# Patient Record
Sex: MALE | Race: AMERICAN INDIAN OR ALASKA NATIVE | NOT HISPANIC OR LATINO | URBAN - METROPOLITAN AREA
[De-identification: names, ages, dates, MRNs, and addresses within clinical notes are randomized per-mention and may not be internally consistent; named-entity substitution may affect disease eponyms.]

---

## 2018-07-16 VITALS
DIASTOLIC BLOOD PRESSURE: 64 MMHG | RESPIRATION RATE: 18 BRPM | TEMPERATURE: 97 F | SYSTOLIC BLOOD PRESSURE: 142 MMHG | HEIGHT: 67 IN | OXYGEN SATURATION: 99 % | WEIGHT: 184.97 LBS | HEART RATE: 65 BPM

## 2018-07-16 NOTE — H&P ADULT - PMH
Diabetes    HTN (hypertension)    Mitral regurgitation    Obesity    PVD (peripheral vascular disease)

## 2018-07-16 NOTE — H&P ADULT - RS GEN PE MLT RESP DETAILS PC
no wheezes/breath sounds equal/no rales/clear to auscultation bilaterally/no chest wall tenderness/no rhonchi

## 2018-07-16 NOTE — H&P ADULT - HISTORY OF PRESENT ILLNESS
Patient is a 56 y/o male active smoker with PMHx of HTN, DM, MR, PVD (s/p fem-pop bypass bilaterally), hx of hyperkalemia with ACE/ARB therapy, obesity, with recent hospitalization in [facility] for "collapsed lung" who presented to cardiologist Dr. De La Rosa c/o     NST 06/21/2018: EF 53%, moderate sized area of anterior apical and inferior wall ischemia, LV wall motion is normal    In light of pt's risk factors, CCS ___ anginal equivalent symptoms, and abnormal stress test pt is referred for cardiac catheterization with possible intervention if clinically indicated to r/o CAD Please confirm meds    Patient is a 56 y/o male former smoker with PMHx of HTN, DM, MR, PVD (s/p fem-pop bypass bilaterally), hx of hyperkalemia with ACE/ARB therapy, obesity, with recent hospitalization in [facility] for "collapsed lung" and dyspnea and was referred to Dr. De La Rosa for stress test. Pt c/o DALEY upon walking a half block that is subsequently resolved with rest. Pt denies CP, palpitations, syncope, PND/orthopnea or LE edema.  NST 06/21/2018: EF 53%, moderate sized area of anterior apical and inferior wall ischemia, LV wall motion is normal    In light of pt's risk factors, CCS III anginal equivalent symptoms, and abnormal stress test pt is referred for cardiac catheterization with possible intervention if clinically indicated to r/o CAD Patient is a 56 y/o male former smoker with PMHx of HTN, DM, MR, PVD (s/p fem-pop bypass bilaterally), hx of hyperkalemia with ACE/ARB therapy, obesity, with recent hospitalization in [facility] for "collapsed lung" and dyspnea and was referred to Dr. De La Rosa for stress test. Pt c/o DALEY upon walking a half block that is subsequently resolved with rest. Pt denies CP, palpitations, syncope, PND/orthopnea or LE edema.  NST 06/21/2018: EF 53%, moderate sized area of anterior apical and inferior wall ischemia, LV wall motion is normal    In light of pt's risk factors, CCS III anginal equivalent symptoms, and abnormal stress test pt is referred for cardiac catheterization with possible intervention if clinically indicated to r/o CAD Patient is a 56 y/o male active smoker with PMHx of HTN, DM, MR, PVD (s/p fem-pop bypass bilaterally), hx of hyperkalemia with ACE/ARB therapy, obesity, with recent hospitalization in [facility] for "collapsed lung" and dyspnea and was referred to Dr. De La Rosa for stress test. Pt c/o DALEY upon walking a half block that is subsequently resolved with rest. Pt denies CP, palpitations, syncope, PND/orthopnea or LE edema.  NST 06/21/2018: EF 53%, moderate sized area of anterior apical and inferior wall ischemia, LV wall motion is normal    In light of pt's risk factors, CCS III anginal equivalent symptoms, and abnormal stress test pt is referred for cardiac catheterization with possible intervention if clinically indicated to r/o CAD

## 2018-07-16 NOTE — H&P ADULT - EXTREMITIES COMMENTS
+1 pitting edema bilaterally up to shin +1 pitting edema bilaterally up to shin, scars bilaterally (right from knee to groin, left entire leg)

## 2018-07-16 NOTE — H&P ADULT - ASSESSMENT
56 y/o male former smoker with PMHx of HTN, DM, MR, PVD (s/p fem-pop bypass bilaterally), hx of hyperkalemia with ACE/ARB therapy, obesity, with recent hospitalization in [facility] for "collapsed lung" and dyspnea, was recently found to have an abnormal nuclear stress test. Pt is therefore being referred for cardiac catheterization with possible intervention if indicated.     - Pt did not take any medications this morning   - Will given  mg and Plavix 300 mg prior-to-procedure per Dr. De La Rosa   - EKG w/ NSR, peaked T waves in V3-V4, TWI in avL   - Labs reviewed   - , will continue to monitor closely   - The procedure, it's risks, consequences, and benefits were discussed with patient in detail. Pt verbalized understanding and agreed to proceed with the procedure, consent signed and placed in chart.

## 2018-07-17 ENCOUNTER — OUTPATIENT (OUTPATIENT)
Dept: OUTPATIENT SERVICES | Facility: HOSPITAL | Age: 58
LOS: 1 days | Discharge: HOME | End: 2018-07-17
Payer: COMMERCIAL

## 2018-07-17 DIAGNOSIS — Z90.49 ACQUIRED ABSENCE OF OTHER SPECIFIED PARTS OF DIGESTIVE TRACT: Chronic | ICD-10-CM

## 2018-07-17 LAB
ANION GAP SERPL CALC-SCNC: 14 MMOL/L — SIGNIFICANT CHANGE UP (ref 5–17)
APTT BLD: 30.4 SEC — SIGNIFICANT CHANGE UP (ref 27.5–37.4)
BASOPHILS NFR BLD AUTO: 0.5 % — SIGNIFICANT CHANGE UP (ref 0–2)
BUN SERPL-MCNC: 28 MG/DL — HIGH (ref 7–23)
CALCIUM SERPL-MCNC: 9.2 MG/DL — SIGNIFICANT CHANGE UP (ref 8.4–10.5)
CHLORIDE SERPL-SCNC: 98 MMOL/L — SIGNIFICANT CHANGE UP (ref 96–108)
CHOLEST SERPL-MCNC: 222 MG/DL — HIGH (ref 10–199)
CK MB CFR SERPL CALC: 4.4 NG/ML — SIGNIFICANT CHANGE UP (ref 0–6.7)
CK SERPL-CCNC: 235 U/L — HIGH (ref 30–200)
CO2 SERPL-SCNC: 26 MMOL/L — SIGNIFICANT CHANGE UP (ref 22–31)
CREAT SERPL-MCNC: 1.17 MG/DL — SIGNIFICANT CHANGE UP (ref 0.5–1.3)
CRP SERPL-MCNC: 0.38 MG/DL — SIGNIFICANT CHANGE UP (ref 0–0.4)
EOSINOPHIL NFR BLD AUTO: 1.2 % — SIGNIFICANT CHANGE UP (ref 0–6)
GLUCOSE BLDC GLUCOMTR-MCNC: 193 MG/DL — HIGH (ref 70–99)
GLUCOSE SERPL-MCNC: 199 MG/DL — HIGH (ref 70–99)
HBA1C BLD-MCNC: 10.9 % — HIGH (ref 4–5.6)
HCT VFR BLD CALC: 35.8 % — LOW (ref 39–50)
HDLC SERPL-MCNC: 48 MG/DL — SIGNIFICANT CHANGE UP (ref 40–125)
HGB BLD-MCNC: 10.6 G/DL — LOW (ref 13–17)
INR BLD: 1.02 — SIGNIFICANT CHANGE UP (ref 0.88–1.16)
LIPID PNL WITH DIRECT LDL SERPL: 105 MG/DL — SIGNIFICANT CHANGE UP
LYMPHOCYTES # BLD AUTO: 35.2 % — SIGNIFICANT CHANGE UP (ref 13–44)
MCHC RBC-ENTMCNC: 21.1 PG — LOW (ref 27–34)
MCHC RBC-ENTMCNC: 29.6 G/DL — LOW (ref 32–36)
MCV RBC AUTO: 71.3 FL — LOW (ref 80–100)
MONOCYTES NFR BLD AUTO: 10.6 % — SIGNIFICANT CHANGE UP (ref 2–14)
NEUTROPHILS NFR BLD AUTO: 52.5 % — SIGNIFICANT CHANGE UP (ref 43–77)
PLATELET # BLD AUTO: 211 K/UL — SIGNIFICANT CHANGE UP (ref 150–400)
POTASSIUM SERPL-MCNC: 4.8 MMOL/L — SIGNIFICANT CHANGE UP (ref 3.5–5.3)
POTASSIUM SERPL-SCNC: 4.8 MMOL/L — SIGNIFICANT CHANGE UP (ref 3.5–5.3)
PROTHROM AB SERPL-ACNC: 11.3 SEC — SIGNIFICANT CHANGE UP (ref 9.8–12.7)
RBC # BLD: 5.02 M/UL — SIGNIFICANT CHANGE UP (ref 4.2–5.8)
RBC # FLD: 16.9 % — SIGNIFICANT CHANGE UP (ref 10.3–16.9)
SODIUM SERPL-SCNC: 138 MMOL/L — SIGNIFICANT CHANGE UP (ref 135–145)
TOTAL CHOLESTEROL/HDL RATIO MEASUREMENT: 4.6 RATIO — SIGNIFICANT CHANGE UP (ref 3.4–9.6)
TRIGL SERPL-MCNC: 347 MG/DL — HIGH (ref 10–149)
WBC # BLD: 11.2 K/UL — HIGH (ref 3.8–10.5)
WBC # FLD AUTO: 11.2 K/UL — HIGH (ref 3.8–10.5)

## 2018-07-17 PROCEDURE — 92978 ENDOLUMINL IVUS OCT C 1ST: CPT | Mod: 26,LD

## 2018-07-17 PROCEDURE — 92978 ENDOLUMINL IVUS OCT C 1ST: CPT | Mod: LM

## 2018-07-17 PROCEDURE — 93458 L HRT ARTERY/VENTRICLE ANGIO: CPT | Mod: 26

## 2018-07-17 PROCEDURE — 80061 LIPID PANEL: CPT

## 2018-07-17 PROCEDURE — 82553 CREATINE MB FRACTION: CPT

## 2018-07-17 PROCEDURE — 85025 COMPLETE CBC W/AUTO DIFF WBC: CPT

## 2018-07-17 PROCEDURE — C1769: CPT

## 2018-07-17 PROCEDURE — 80048 BASIC METABOLIC PNL TOTAL CA: CPT

## 2018-07-17 PROCEDURE — 99223 1ST HOSP IP/OBS HIGH 75: CPT

## 2018-07-17 PROCEDURE — 86140 C-REACTIVE PROTEIN: CPT

## 2018-07-17 PROCEDURE — 92979 ENDOLUMINL IVUS OCT C EA: CPT | Mod: 26,LM

## 2018-07-17 PROCEDURE — 93458 L HRT ARTERY/VENTRICLE ANGIO: CPT

## 2018-07-17 PROCEDURE — C1753: CPT

## 2018-07-17 PROCEDURE — 82962 GLUCOSE BLOOD TEST: CPT

## 2018-07-17 PROCEDURE — 83036 HEMOGLOBIN GLYCOSYLATED A1C: CPT

## 2018-07-17 PROCEDURE — 85610 PROTHROMBIN TIME: CPT

## 2018-07-17 PROCEDURE — 82550 ASSAY OF CK (CPK): CPT

## 2018-07-17 PROCEDURE — 85730 THROMBOPLASTIN TIME PARTIAL: CPT

## 2018-07-17 PROCEDURE — C1887: CPT

## 2018-07-17 PROCEDURE — 92979 ENDOLUMINL IVUS OCT C EA: CPT | Mod: LD

## 2018-07-17 RX ORDER — SODIUM CHLORIDE 9 MG/ML
500 INJECTION INTRAMUSCULAR; INTRAVENOUS; SUBCUTANEOUS
Qty: 0 | Refills: 0 | Status: DISCONTINUED | OUTPATIENT
Start: 2018-07-17 | End: 2018-07-17

## 2018-07-17 RX ORDER — ASPIRIN/CALCIUM CARB/MAGNESIUM 324 MG
325 TABLET ORAL ONCE
Qty: 0 | Refills: 0 | Status: COMPLETED | OUTPATIENT
Start: 2018-07-17 | End: 2018-07-17

## 2018-07-17 RX ORDER — CHLORHEXIDINE GLUCONATE 213 G/1000ML
1 SOLUTION TOPICAL ONCE
Qty: 0 | Refills: 0 | Status: DISCONTINUED | OUTPATIENT
Start: 2018-07-17 | End: 2018-07-17

## 2018-07-17 RX ORDER — INSULIN LISPRO 100/ML
VIAL (ML) SUBCUTANEOUS ONCE
Qty: 0 | Refills: 0 | Status: DISCONTINUED | OUTPATIENT
Start: 2018-07-17 | End: 2018-07-17

## 2018-07-17 RX ORDER — CLOPIDOGREL BISULFATE 75 MG/1
300 TABLET, FILM COATED ORAL ONCE
Qty: 0 | Refills: 0 | Status: COMPLETED | OUTPATIENT
Start: 2018-07-17 | End: 2018-07-17

## 2018-07-17 RX ADMIN — Medication 325 MILLIGRAM(S): at 10:46

## 2018-07-17 RX ADMIN — CLOPIDOGREL BISULFATE 300 MILLIGRAM(S): 75 TABLET, FILM COATED ORAL at 10:46

## 2018-07-17 NOTE — PROGRESS NOTE ADULT - SUBJECTIVE AND OBJECTIVE BOX
Interventional Cardiology PA SDA Discharge Note      Patient seen and examined at bedside resting comfortably, denies any chest pain, SOB, palpitations, dizziness or radial access site pain.      Afebrile, VSS    Ext:    		Right  Radial : soft, NT, no hematoma, no bleeding, dressing; C/D/I      Pulses:    intact right radial pulse 2+    A/P:  55 yr old M former smoker with PMHx of HTN, DM, MR, PVD (s/p fem-pop bypass bilaterally), with CCS Angina Class III equivalent symptoms and abnormal NST who presented to [facility] for evaluation. Patient s/p diagnostic cardiac catheterization which revealed 40% dLM lesion (IVUS 5.0mm2), 85% pLAD lesion (IVUS <2.0mm2), 90% OM1 lesion, 80% p/m/dRCA lesion, EF:65%, EDP 18mmHg. Patient recommended to continue medical therapy and will be screened for Hybrid trial with Dr. Matthews.                  1.	Stable for discharge as per attending Dr. De La Rosa after pt voids, wrist stable and 30 minutes of ambulation.  2.	Follow-up with PMD/Cardiologist Dr. Gary De La Rosa in 1-2 weeks  3.	Discharged forms signed and copies in chart

## 2018-07-17 NOTE — CONSULT NOTE ADULT - SUBJECTIVE AND OBJECTIVE BOX
Surgeon: Dr. Matthews    Requesting Physician: Dr. Guillen    HISTORY OF PRESENT ILLNESS:  Patient is a 54 y/o male former smoker with PMHx of HTN, DM, MR, PVD (s/p fem-pop bypass bilaterally), hx of hyperkalemia with ACE/ARB therapy, obesity, with recent hospitalization in [facility] for "collapsed lung" and dyspnea and was referred to Dr. De La Rosa for stress test. Pt c/o DALEY upon walking a half block that is subsequently resolved with rest. Pt denies CP, palpitations, syncope, PND/orthopnea or LE edema.  NST 06/21/2018: EF 53%, moderate sized area of anterior apical and inferior wall ischemia, LV wall motion is normal.  In light of pt's risk factors, CCS III anginal equivalent symptoms, and abnormal stress test pt is referred for cardiac catheterization with possible intervention if clinically indicated to r/o CAD.  Cardiac cath done revealed 85% LAD, 90% OM, and Dr. Matthews consulted for Hybrid trial.  Patient currently denies CP, SOB, palpitations.      PAST MEDICAL & SURGICAL HISTORY:  Mitral regurgitation  Obesity  PVD (peripheral vascular disease)  Diabetes  HTN (hypertension)  History of appendectomy    MEDICATIONS  (STANDING):  sodium chloride 0.9%. 500 milliLiter(s) (75 mL/Hr) IV Continuous <Continuous>    MEDICATIONS  (PRN):    Allergies    No Known Allergies    Intolerances    SOCIAL HISTORY:  Smoker:  YES, active everyday smoker        ETOH use:   NO               Ilicit Drug use:  NO  Assisted device use (Cane / Walker): none  Live with: family    FAMILY HISTORY:  No pertinent family history in first degree relatives    Review of Systems  CONSTITUTIONAL: Denies Fevers / chills, sweats, fatigue, weight loss, weight gain     NEURO: Denies parathesias, seizures, syncope, confusion    EYES: Denies Blurry vision, discharge, pain, loss of vision     ENMT: Denies Difficulty hearing, vertigo, dysphagia, epistaxis, recent dental work    CV: +DALEY Denies Chest pain, palpitations, orthopnea   RESPIRATORY: Denies Wheezing, SOB, cough / sputum, hemoptysis    GI: Denies Nausea, vommiting, diarrhea, constipation, melena     : Denies Hematuria, dysuria, urgency, incontinence       MUSKULOSKELETAL: Denies  arthritis, joint swelling, muscle weakness    SKIN/BREAST: Denies  rash, itching, benson loss, masses   PSYCH:  Denies depresion, anxiety, suicidal ideation       HEME/LYMPH: Denies  bruises easily, enlarged lymph nodes, tender lymph nodes     ENDOCRINE:  Denies cold intolerance, heat intolerance, polydipsia       PHYSICAL EXAM  Vital Signs Last 24 Hrs  T(C): --  T(F): --  HR: --  BP: --  BP(mean): --  RR: --  SpO2: --    CONSTITUTIONAL:  WNL  NEURO: WNL                      EYES: WNL  ENMT:  WNL  CV:  WNL  RESPIRATORY:  WNL  GI: WNL  : ZAMORA + / - WNL  MUSKULOSKELETAL:   WNL  SKIN / BREAST: WNL  Extremities +1 pitting edema bilaterally up to shin,  Vascular radial pulses 1+ right, 2+ left.  DP/PT pulses dopplerable but not palpable                                                           LABS:                        10.6   11.2  )-----------( 211      ( 17 Jul 2018 09:57 )             35.8     07-17    138  |  98  |  28<H>  ----------------------------<  199<H>  4.8   |  26  |  1.17    Ca    9.2      17 Jul 2018 09:57      PT/INR - ( 17 Jul 2018 10:22 )   PT: 11.3 sec;   INR: 1.02          PTT - ( 17 Jul 2018 10:22 )  PTT:30.4 sec    CARDIAC MARKERS ( 17 Jul 2018 09:57 )  x     / x     / 235 U/L / x     / 4.4 ng/mL      Hemoglobin A1C, Whole Blood: 10.9 % (07-17 @ 09:57)      RADIOLOGY & ADDITIONAL STUDIES:  CAROTID U/S: N/A    CXR: N/A    CT Scan:N/A    EKG: N/A    TTE / JOEY: N/A    Cardiac Cath: in HPI

## 2018-07-17 NOTE — CONSULT NOTE ADULT - ASSESSMENT
A/P:  55 yr old M former smoker with PMHx of HTN, DM, MR, PVD (s/p fem-pop bypass bilaterally), with CCS Angina Class III equivalent symptoms and abnormal NST who presented to [facility] for evaluation. Patient s/p diagnostic cardiac catheterization which revealed 40% dLM lesion (IVUS 5.0mm2), 85% pLAD lesion (IVUS <2.0mm2), 90% OM1 lesion, 80% p/m/dRCA lesion, EF:65%, EDP 18mmHg. Dr. Matthews evaluated and patient deemed candidate for Hybrid trial, randomized to PCI group.     Con't with medical therapy per primary team  PCI per Dr. Guillen.

## 2018-07-18 PROBLEM — I34.0 NONRHEUMATIC MITRAL (VALVE) INSUFFICIENCY: Chronic | Status: ACTIVE | Noted: 2018-07-16

## 2018-07-18 PROBLEM — I73.9 PERIPHERAL VASCULAR DISEASE, UNSPECIFIED: Chronic | Status: ACTIVE | Noted: 2018-07-16

## 2018-07-18 PROBLEM — E66.9 OBESITY, UNSPECIFIED: Chronic | Status: ACTIVE | Noted: 2018-07-16

## 2018-07-26 VITALS
HEIGHT: 69 IN | HEART RATE: 65 BPM | DIASTOLIC BLOOD PRESSURE: 79 MMHG | OXYGEN SATURATION: 98 % | SYSTOLIC BLOOD PRESSURE: 170 MMHG | RESPIRATION RATE: 18 BRPM | WEIGHT: 190.04 LBS | TEMPERATURE: 99 F

## 2018-07-26 RX ORDER — CHLORHEXIDINE GLUCONATE 213 G/1000ML
1 SOLUTION TOPICAL ONCE
Qty: 0 | Refills: 0 | Status: DISCONTINUED | OUTPATIENT
Start: 2018-07-27 | End: 2018-07-28

## 2018-07-26 NOTE — H&P ADULT - HISTORY OF PRESENT ILLNESS
Patient is a 54 y/o male active smoker with PMHx of HTN, DM, MR, PVD (s/p fem-pop bypass bilaterally), hx of hyperkalemia with ACE/ARB therapy, obesity, with recent hospitalization in [facility] for "collapsed lung" and dyspnea and was referred to Dr. De La Rosa for stress test. Pt c/o DALEY upon walking a half block that is subsequently resolved with rest. Pt denies CP, palpitations, syncope, PND/orthopnea or LE edema.  NST 06/21/2018: EF 53%, moderate sized area of anterior apical and inferior wall ischemia, LV wall motion is normal    In light of pt's risk factors, CCS III anginal equivalent symptoms, and abnormal stress test pt is referred for cardiac catheterization with possible intervention if clinically indicated to r/o CAD Confirm meds upon arrival    Patient is a 56 y/o male active smoker with PMHx of HTN, DM, MR, PVD (s/p fem-pop bypass bilaterally), hx of hyperkalemia with ACE/ARB therapy, obesity, with recent hospitalization in [facility] for "collapsed lung" and dyspnea and was referred to Dr. De La Rosa for stress test. Pt c/o DALEY upon walking a half block that is subsequently resolved with rest. Pt denies CP, palpitations, syncope, PND/orthopnea or LE edema.  NST 06/21/2018: EF 53%, moderate sized area of anterior apical and inferior wall ischemia, LV wall motion is normal    In light of pt's risk factors, CCS III anginal equivalent symptoms, and abnormal stress test pt is referred for cardiac catheterization with possible intervention if clinically indicated to r/o CAD Confirm meds upon arrival    Patient is a 56 y/o male active smoker with PMHx of HTN, DM, MR, PVD (s/p fem-pop bypass bilaterally), hx of hyperkalemia with ACE/ARB therapy, obesity, with recent hospitalization in [facility] for "collapsed lung" and dyspnea and was referred to Dr. De La Rosa for stress test. Pt states for the past three days he has been having constant left sided chest tightness 4/10 severity that radiates to his back that is associated with SOB and worsened with exertion but also occurs independent of activity. Pt reports that he thinks that pain started secondary to being nervous about upcoming staged PCI. Pt denies palpitations, syncope, PND/orthopnea or LE edema.  NST 06/21/2018: EF 53%, moderate sized area of anterior apical and inferior wall ischemia, LV wall motion is normal    In light of pt's risk factors, CCS IV anginal symptoms, and abnormal stress test and known residual disease pt returns for staged PCI Confirm meds upon arrival    Patient is a 54 y/o male active smoker with PMHx of HTN, DM, MR, PVD (s/p fem-pop bypass bilaterally), hx of hyperkalemia with ACE/ARB therapy, obesity, with recent hospitalization in [facility] for "collapsed lung" and dyspnea and was referred to Dr. De La Rosa for stress test. Pt states for the past three days he has been having constant left sided chest tightness 4/10 severity that radiates to his back that is associated with SOB and worsened with exertion but also occurs independent of activity. Pt reports that he thinks that pain started secondary to being nervous about upcoming staged PCI. Pt denies palpitations, syncope, PND/orthopnea or LE edema.  NST 06/21/2018: EF 53%, moderate sized area of anterior apical and inferior wall ischemia, LV wall motion is normal    Cardiac cath 07/17/2018: EF: 65%, EDP 18, dLM 50% stenosis, pLAD 85% stenosis, OM1 90% stenosis, pRCA 80%, mRCA 80%, dRCA 80% stenosis  In light of pt's risk factors, new CCS IV anginal symptoms, and abnormal stress test and known residual disease pt returns for staged PCI Patient is a 54 y/o male active smoker with PMHx of HTN, DM, MR, PVD (s/p fem-pop bypass bilaterally), hx of hyperkalemia with ACE/ARB therapy, obesity, with recent hospitalization in [facility] for "collapsed lung" and dyspnea and was referred to Dr. De La Rosa for stress test. Pt states for the past three days he has been having constant left sided chest tightness 4/10 severity that radiates to his back that is associated with SOB and worsened with exertion but also occurs independent of activity. Pt reports that he thinks that pain started secondary to being nervous about upcoming staged PCI. Pt denies palpitations, syncope, PND/orthopnea or LE edema.  NST 06/21/2018: EF 53%, moderate sized area of anterior apical and inferior wall ischemia, LV wall motion is normal    Cardiac cath 07/17/2018: EF: 65%, EDP 18, dLM 50% stenosis, pLAD 85% stenosis, OM1 90% stenosis, pRCA 80%, mRCA 80%, dRCA 80% stenosis  In light of pt's risk factors, new CCS IV anginal symptoms, and abnormal stress test and known residual disease pt returns for staged PCI

## 2018-07-26 NOTE — H&P ADULT - ASSESSMENT
Patient is a 54 y/o male active smoker with PMHx of HTN, DM, MR, PVD (s/p fem-pop bypass bilaterally), hx of hyperkalemia with ACE/ARB therapy, obesity presented for staged PCI of residual lesion    ASA III Mallampati III  Precath consented  Given daily dose of ASA 81mg POx1 and Plavix 75mg POx1  Started IVF nS @ 75cc/h    Risks & benefits of procedure and alternative therapy have been explained to the patient including but not limited to: allergic reaction, bleeding w/possible need for blood transfusion, infection, renal and vascular compromise, limb damage, arrhythmia, stroke, vessel dissection/perforation, Myocardial infarction, emergent CABG. Informed consent obtained and in chart.

## 2018-07-27 ENCOUNTER — INPATIENT (INPATIENT)
Facility: HOSPITAL | Age: 58
LOS: 0 days | Discharge: HOME | DRG: 247 | End: 2018-07-28
Attending: INTERNAL MEDICINE | Admitting: INTERNAL MEDICINE
Payer: COMMERCIAL

## 2018-07-27 DIAGNOSIS — Z90.49 ACQUIRED ABSENCE OF OTHER SPECIFIED PARTS OF DIGESTIVE TRACT: Chronic | ICD-10-CM

## 2018-07-27 LAB
ANION GAP SERPL CALC-SCNC: 12 MMOL/L — SIGNIFICANT CHANGE UP (ref 5–17)
APTT BLD: >200 SEC — CRITICAL HIGH (ref 27.5–37.4)
BASOPHILS NFR BLD AUTO: 0.7 % — SIGNIFICANT CHANGE UP (ref 0–2)
BUN SERPL-MCNC: 20 MG/DL — SIGNIFICANT CHANGE UP (ref 7–23)
CALCIUM SERPL-MCNC: 8.9 MG/DL — SIGNIFICANT CHANGE UP (ref 8.4–10.5)
CHLORIDE SERPL-SCNC: 101 MMOL/L — SIGNIFICANT CHANGE UP (ref 96–108)
CHOLEST SERPL-MCNC: 220 MG/DL — HIGH (ref 10–199)
CK MB CFR SERPL CALC: 3.7 NG/ML — SIGNIFICANT CHANGE UP (ref 0–6.7)
CK SERPL-CCNC: 230 U/L — HIGH (ref 30–200)
CO2 SERPL-SCNC: 26 MMOL/L — SIGNIFICANT CHANGE UP (ref 22–31)
CREAT SERPL-MCNC: 1.11 MG/DL — SIGNIFICANT CHANGE UP (ref 0.5–1.3)
CRP SERPL-MCNC: 0.42 MG/DL — HIGH (ref 0–0.4)
EOSINOPHIL NFR BLD AUTO: 2.5 % — SIGNIFICANT CHANGE UP (ref 0–6)
GLUCOSE BLDC GLUCOMTR-MCNC: 218 MG/DL — HIGH (ref 70–99)
GLUCOSE BLDC GLUCOMTR-MCNC: 257 MG/DL — HIGH (ref 70–99)
GLUCOSE SERPL-MCNC: 227 MG/DL — HIGH (ref 70–99)
HBA1C BLD-MCNC: 10.9 % — HIGH (ref 4–5.6)
HCT VFR BLD CALC: 35.1 % — LOW (ref 39–50)
HDLC SERPL-MCNC: 52 MG/DL — SIGNIFICANT CHANGE UP (ref 40–125)
HGB BLD-MCNC: 10.1 G/DL — LOW (ref 13–17)
INR BLD: 1.02 — SIGNIFICANT CHANGE UP (ref 0.88–1.16)
LIPID PNL WITH DIRECT LDL SERPL: 121 MG/DL — SIGNIFICANT CHANGE UP
LYMPHOCYTES # BLD AUTO: 41.3 % — SIGNIFICANT CHANGE UP (ref 13–44)
MCHC RBC-ENTMCNC: 21.1 PG — LOW (ref 27–34)
MCHC RBC-ENTMCNC: 28.8 G/DL — LOW (ref 32–36)
MCV RBC AUTO: 73.3 FL — LOW (ref 80–100)
MONOCYTES NFR BLD AUTO: 8.1 % — SIGNIFICANT CHANGE UP (ref 2–14)
NEUTROPHILS NFR BLD AUTO: 47.4 % — SIGNIFICANT CHANGE UP (ref 43–77)
PLATELET # BLD AUTO: 186 K/UL — SIGNIFICANT CHANGE UP (ref 150–400)
POTASSIUM SERPL-MCNC: 4.6 MMOL/L — SIGNIFICANT CHANGE UP (ref 3.5–5.3)
POTASSIUM SERPL-SCNC: 4.6 MMOL/L — SIGNIFICANT CHANGE UP (ref 3.5–5.3)
PROTHROM AB SERPL-ACNC: 11.3 SEC — SIGNIFICANT CHANGE UP (ref 9.8–12.7)
RBC # BLD: 4.79 M/UL — SIGNIFICANT CHANGE UP (ref 4.2–5.8)
RBC # FLD: 16.9 % — SIGNIFICANT CHANGE UP (ref 10.3–16.9)
SODIUM SERPL-SCNC: 139 MMOL/L — SIGNIFICANT CHANGE UP (ref 135–145)
TOTAL CHOLESTEROL/HDL RATIO MEASUREMENT: 4.2 RATIO — SIGNIFICANT CHANGE UP (ref 3.4–9.6)
TRIGL SERPL-MCNC: 236 MG/DL — HIGH (ref 10–149)
WBC # BLD: 9.9 K/UL — SIGNIFICANT CHANGE UP (ref 3.8–10.5)
WBC # FLD AUTO: 9.9 K/UL — SIGNIFICANT CHANGE UP (ref 3.8–10.5)

## 2018-07-27 PROCEDURE — 92978 ENDOLUMINL IVUS OCT C 1ST: CPT | Mod: 26,LD

## 2018-07-27 PROCEDURE — 93010 ELECTROCARDIOGRAM REPORT: CPT

## 2018-07-27 PROCEDURE — 92979 ENDOLUMINL IVUS OCT C EA: CPT | Mod: 26

## 2018-07-27 PROCEDURE — 99223 1ST HOSP IP/OBS HIGH 75: CPT | Mod: 25

## 2018-07-27 PROCEDURE — 99233 SBSQ HOSP IP/OBS HIGH 50: CPT

## 2018-07-27 PROCEDURE — 93458 L HRT ARTERY/VENTRICLE ANGIO: CPT | Mod: 26,XU

## 2018-07-27 PROCEDURE — 92928 PRQ TCAT PLMT NTRAC ST 1 LES: CPT | Mod: LD

## 2018-07-27 PROCEDURE — 76937 US GUIDE VASCULAR ACCESS: CPT | Mod: 26

## 2018-07-27 RX ORDER — HYDRALAZINE HCL 50 MG
50 TABLET ORAL EVERY 8 HOURS
Qty: 0 | Refills: 0 | Status: DISCONTINUED | OUTPATIENT
Start: 2018-07-28 | End: 2018-07-28

## 2018-07-27 RX ORDER — CLOPIDOGREL BISULFATE 75 MG/1
75 TABLET, FILM COATED ORAL DAILY
Qty: 0 | Refills: 0 | Status: DISCONTINUED | OUTPATIENT
Start: 2018-07-27 | End: 2018-07-28

## 2018-07-27 RX ORDER — SODIUM CHLORIDE 9 MG/ML
500 INJECTION INTRAMUSCULAR; INTRAVENOUS; SUBCUTANEOUS
Qty: 0 | Refills: 0 | Status: DISCONTINUED | OUTPATIENT
Start: 2018-07-27 | End: 2018-07-27

## 2018-07-27 RX ORDER — HYDRALAZINE HCL 50 MG
25 TABLET ORAL ONCE
Qty: 0 | Refills: 0 | Status: COMPLETED | OUTPATIENT
Start: 2018-07-27 | End: 2018-07-27

## 2018-07-27 RX ORDER — PANTOPRAZOLE SODIUM 20 MG/1
40 TABLET, DELAYED RELEASE ORAL DAILY
Qty: 0 | Refills: 0 | Status: DISCONTINUED | OUTPATIENT
Start: 2018-07-27 | End: 2018-07-28

## 2018-07-27 RX ORDER — LOSARTAN POTASSIUM 100 MG/1
50 TABLET, FILM COATED ORAL DAILY
Qty: 0 | Refills: 0 | Status: DISCONTINUED | OUTPATIENT
Start: 2018-07-27 | End: 2018-07-28

## 2018-07-27 RX ORDER — ALBUTEROL 90 UG/1
2 AEROSOL, METERED ORAL EVERY 6 HOURS
Qty: 0 | Refills: 0 | Status: DISCONTINUED | OUTPATIENT
Start: 2018-07-27 | End: 2018-07-28

## 2018-07-27 RX ORDER — ATORVASTATIN CALCIUM 80 MG/1
80 TABLET, FILM COATED ORAL DAILY
Qty: 0 | Refills: 0 | Status: DISCONTINUED | OUTPATIENT
Start: 2018-07-27 | End: 2018-07-28

## 2018-07-27 RX ORDER — BUDESONIDE AND FORMOTEROL FUMARATE DIHYDRATE 160; 4.5 UG/1; UG/1
2 AEROSOL RESPIRATORY (INHALATION)
Qty: 0 | Refills: 0 | Status: DISCONTINUED | OUTPATIENT
Start: 2018-07-27 | End: 2018-07-28

## 2018-07-27 RX ORDER — AMLODIPINE BESYLATE 2.5 MG/1
5 TABLET ORAL ONCE
Qty: 0 | Refills: 0 | Status: COMPLETED | OUTPATIENT
Start: 2018-07-27 | End: 2018-07-27

## 2018-07-27 RX ORDER — DEXTROSE 50 % IN WATER 50 %
25 SYRINGE (ML) INTRAVENOUS ONCE
Qty: 0 | Refills: 0 | Status: DISCONTINUED | OUTPATIENT
Start: 2018-07-27 | End: 2018-07-28

## 2018-07-27 RX ORDER — DOCUSATE SODIUM 100 MG
100 CAPSULE ORAL
Qty: 0 | Refills: 0 | Status: DISCONTINUED | OUTPATIENT
Start: 2018-07-27 | End: 2018-07-28

## 2018-07-27 RX ORDER — INSULIN GLARGINE 100 [IU]/ML
14 INJECTION, SOLUTION SUBCUTANEOUS AT BEDTIME
Qty: 0 | Refills: 0 | Status: DISCONTINUED | OUTPATIENT
Start: 2018-07-27 | End: 2018-07-28

## 2018-07-27 RX ORDER — GLUCAGON INJECTION, SOLUTION 0.5 MG/.1ML
1 INJECTION, SOLUTION SUBCUTANEOUS ONCE
Qty: 0 | Refills: 0 | Status: DISCONTINUED | OUTPATIENT
Start: 2018-07-27 | End: 2018-07-28

## 2018-07-27 RX ORDER — DEXTROSE 50 % IN WATER 50 %
15 SYRINGE (ML) INTRAVENOUS ONCE
Qty: 0 | Refills: 0 | Status: DISCONTINUED | OUTPATIENT
Start: 2018-07-27 | End: 2018-07-28

## 2018-07-27 RX ORDER — HYDRALAZINE HCL 50 MG
25 TABLET ORAL EVERY 8 HOURS
Qty: 0 | Refills: 0 | Status: DISCONTINUED | OUTPATIENT
Start: 2018-07-27 | End: 2018-07-27

## 2018-07-27 RX ORDER — ASPIRIN/CALCIUM CARB/MAGNESIUM 324 MG
81 TABLET ORAL DAILY
Qty: 0 | Refills: 0 | Status: DISCONTINUED | OUTPATIENT
Start: 2018-07-27 | End: 2018-07-28

## 2018-07-27 RX ORDER — AMLODIPINE BESYLATE 2.5 MG/1
10 TABLET ORAL DAILY
Qty: 0 | Refills: 0 | Status: DISCONTINUED | OUTPATIENT
Start: 2018-07-28 | End: 2018-07-28

## 2018-07-27 RX ORDER — DEXTROSE 50 % IN WATER 50 %
12.5 SYRINGE (ML) INTRAVENOUS ONCE
Qty: 0 | Refills: 0 | Status: DISCONTINUED | OUTPATIENT
Start: 2018-07-27 | End: 2018-07-28

## 2018-07-27 RX ORDER — SODIUM CHLORIDE 9 MG/ML
1000 INJECTION, SOLUTION INTRAVENOUS
Qty: 0 | Refills: 0 | Status: DISCONTINUED | OUTPATIENT
Start: 2018-07-27 | End: 2018-07-28

## 2018-07-27 RX ORDER — CLOPIDOGREL BISULFATE 75 MG/1
75 TABLET, FILM COATED ORAL ONCE
Qty: 0 | Refills: 0 | Status: COMPLETED | OUTPATIENT
Start: 2018-07-27 | End: 2018-07-27

## 2018-07-27 RX ORDER — FOLIC ACID 0.8 MG
1 TABLET ORAL DAILY
Qty: 0 | Refills: 0 | Status: DISCONTINUED | OUTPATIENT
Start: 2018-07-27 | End: 2018-07-28

## 2018-07-27 RX ORDER — AMLODIPINE BESYLATE 2.5 MG/1
5 TABLET ORAL DAILY
Qty: 0 | Refills: 0 | Status: DISCONTINUED | OUTPATIENT
Start: 2018-07-27 | End: 2018-07-27

## 2018-07-27 RX ORDER — INSULIN LISPRO 100/ML
VIAL (ML) SUBCUTANEOUS ONCE
Qty: 0 | Refills: 0 | Status: COMPLETED | OUTPATIENT
Start: 2018-07-27 | End: 2018-07-27

## 2018-07-27 RX ORDER — GABAPENTIN 400 MG/1
300 CAPSULE ORAL EVERY 8 HOURS
Qty: 0 | Refills: 0 | Status: DISCONTINUED | OUTPATIENT
Start: 2018-07-27 | End: 2018-07-28

## 2018-07-27 RX ORDER — INSULIN LISPRO 100/ML
VIAL (ML) SUBCUTANEOUS
Qty: 0 | Refills: 0 | Status: DISCONTINUED | OUTPATIENT
Start: 2018-07-27 | End: 2018-07-28

## 2018-07-27 RX ORDER — ASPIRIN/CALCIUM CARB/MAGNESIUM 324 MG
81 TABLET ORAL ONCE
Qty: 0 | Refills: 0 | Status: COMPLETED | OUTPATIENT
Start: 2018-07-27 | End: 2018-07-27

## 2018-07-27 RX ORDER — SODIUM CHLORIDE 9 MG/ML
500 INJECTION INTRAMUSCULAR; INTRAVENOUS; SUBCUTANEOUS
Qty: 0 | Refills: 0 | Status: DISCONTINUED | OUTPATIENT
Start: 2018-07-27 | End: 2018-07-28

## 2018-07-27 RX ADMIN — GABAPENTIN 300 MILLIGRAM(S): 400 CAPSULE ORAL at 14:34

## 2018-07-27 RX ADMIN — Medication 100 MILLIGRAM(S): at 17:24

## 2018-07-27 RX ADMIN — ATORVASTATIN CALCIUM 80 MILLIGRAM(S): 80 TABLET, FILM COATED ORAL at 21:17

## 2018-07-27 RX ADMIN — Medication 6: at 16:55

## 2018-07-27 RX ADMIN — BUDESONIDE AND FORMOTEROL FUMARATE DIHYDRATE 2 PUFF(S): 160; 4.5 AEROSOL RESPIRATORY (INHALATION) at 21:59

## 2018-07-27 RX ADMIN — AMLODIPINE BESYLATE 5 MILLIGRAM(S): 2.5 TABLET ORAL at 16:55

## 2018-07-27 RX ADMIN — INSULIN GLARGINE 14 UNIT(S): 100 INJECTION, SOLUTION SUBCUTANEOUS at 21:58

## 2018-07-27 RX ADMIN — Medication 4: at 09:54

## 2018-07-27 RX ADMIN — GABAPENTIN 300 MILLIGRAM(S): 400 CAPSULE ORAL at 21:17

## 2018-07-27 RX ADMIN — CLOPIDOGREL BISULFATE 75 MILLIGRAM(S): 75 TABLET, FILM COATED ORAL at 08:03

## 2018-07-27 RX ADMIN — Medication 25 MILLIGRAM(S): at 16:55

## 2018-07-27 RX ADMIN — Medication 81 MILLIGRAM(S): at 08:04

## 2018-07-27 RX ADMIN — AMLODIPINE BESYLATE 5 MILLIGRAM(S): 2.5 TABLET ORAL at 14:34

## 2018-07-27 RX ADMIN — Medication 25 MILLIGRAM(S): at 14:34

## 2018-07-27 RX ADMIN — LOSARTAN POTASSIUM 50 MILLIGRAM(S): 100 TABLET, FILM COATED ORAL at 14:34

## 2018-07-27 RX ADMIN — Medication 4: at 21:58

## 2018-07-27 RX ADMIN — SODIUM CHLORIDE 75 MILLILITER(S): 9 INJECTION INTRAMUSCULAR; INTRAVENOUS; SUBCUTANEOUS at 08:03

## 2018-07-28 ENCOUNTER — TRANSCRIPTION ENCOUNTER (OUTPATIENT)
Age: 58
End: 2018-07-28

## 2018-07-28 VITALS — TEMPERATURE: 97 F

## 2018-07-28 LAB
ANION GAP SERPL CALC-SCNC: 14 MMOL/L — SIGNIFICANT CHANGE UP (ref 5–17)
BUN SERPL-MCNC: 17 MG/DL — SIGNIFICANT CHANGE UP (ref 7–23)
CALCIUM SERPL-MCNC: 9.3 MG/DL — SIGNIFICANT CHANGE UP (ref 8.4–10.5)
CHLORIDE SERPL-SCNC: 100 MMOL/L — SIGNIFICANT CHANGE UP (ref 96–108)
CO2 SERPL-SCNC: 24 MMOL/L — SIGNIFICANT CHANGE UP (ref 22–31)
CREAT SERPL-MCNC: 1.06 MG/DL — SIGNIFICANT CHANGE UP (ref 0.5–1.3)
GLUCOSE SERPL-MCNC: 215 MG/DL — HIGH (ref 70–99)
HCT VFR BLD CALC: 35.7 % — LOW (ref 39–50)
HGB BLD-MCNC: 10.3 G/DL — LOW (ref 13–17)
MAGNESIUM SERPL-MCNC: 2 MG/DL — SIGNIFICANT CHANGE UP (ref 1.6–2.6)
MCHC RBC-ENTMCNC: 21.2 PG — LOW (ref 27–34)
MCHC RBC-ENTMCNC: 28.9 G/DL — LOW (ref 32–36)
MCV RBC AUTO: 73.5 FL — LOW (ref 80–100)
PLATELET # BLD AUTO: 169 K/UL — SIGNIFICANT CHANGE UP (ref 150–400)
POTASSIUM SERPL-MCNC: 4.4 MMOL/L — SIGNIFICANT CHANGE UP (ref 3.5–5.3)
POTASSIUM SERPL-SCNC: 4.4 MMOL/L — SIGNIFICANT CHANGE UP (ref 3.5–5.3)
RBC # BLD: 4.86 M/UL — SIGNIFICANT CHANGE UP (ref 4.2–5.8)
RBC # FLD: 16.9 % — SIGNIFICANT CHANGE UP (ref 10.3–16.9)
SODIUM SERPL-SCNC: 138 MMOL/L — SIGNIFICANT CHANGE UP (ref 135–145)
WBC # BLD: 9.7 K/UL — SIGNIFICANT CHANGE UP (ref 3.8–10.5)
WBC # FLD AUTO: 9.7 K/UL — SIGNIFICANT CHANGE UP (ref 3.8–10.5)

## 2018-07-28 PROCEDURE — 99239 HOSP IP/OBS DSCHRG MGMT >30: CPT

## 2018-07-28 RX ORDER — AMLODIPINE BESYLATE 2.5 MG/1
1 TABLET ORAL
Qty: 30 | Refills: 3 | OUTPATIENT
Start: 2018-07-28

## 2018-07-28 RX ORDER — HYDRALAZINE HCL 50 MG
1 TABLET ORAL
Qty: 0 | Refills: 0 | COMMUNITY

## 2018-07-28 RX ORDER — ACETAMINOPHEN 500 MG
650 TABLET ORAL ONCE
Qty: 0 | Refills: 0 | Status: COMPLETED | OUTPATIENT
Start: 2018-07-28 | End: 2018-07-28

## 2018-07-28 RX ORDER — CLOPIDOGREL BISULFATE 75 MG/1
1 TABLET, FILM COATED ORAL
Qty: 30 | Refills: 11 | OUTPATIENT
Start: 2018-07-28

## 2018-07-28 RX ORDER — HYDRALAZINE HCL 50 MG
1 TABLET ORAL
Qty: 90 | Refills: 3 | OUTPATIENT
Start: 2018-07-28

## 2018-07-28 RX ORDER — ASPIRIN/CALCIUM CARB/MAGNESIUM 324 MG
1 TABLET ORAL
Qty: 30 | Refills: 11 | OUTPATIENT
Start: 2018-07-28

## 2018-07-28 RX ADMIN — Medication 81 MILLIGRAM(S): at 10:38

## 2018-07-28 RX ADMIN — BUDESONIDE AND FORMOTEROL FUMARATE DIHYDRATE 2 PUFF(S): 160; 4.5 AEROSOL RESPIRATORY (INHALATION) at 09:11

## 2018-07-28 RX ADMIN — LOSARTAN POTASSIUM 50 MILLIGRAM(S): 100 TABLET, FILM COATED ORAL at 06:11

## 2018-07-28 RX ADMIN — GABAPENTIN 300 MILLIGRAM(S): 400 CAPSULE ORAL at 06:11

## 2018-07-28 RX ADMIN — Medication 650 MILLIGRAM(S): at 07:20

## 2018-07-28 RX ADMIN — Medication 4: at 07:37

## 2018-07-28 RX ADMIN — Medication 1 MILLIGRAM(S): at 10:38

## 2018-07-28 RX ADMIN — PANTOPRAZOLE SODIUM 40 MILLIGRAM(S): 20 TABLET, DELAYED RELEASE ORAL at 10:38

## 2018-07-28 RX ADMIN — Medication 50 MILLIGRAM(S): at 06:11

## 2018-07-28 RX ADMIN — Medication 650 MILLIGRAM(S): at 06:22

## 2018-07-28 RX ADMIN — Medication 10: at 11:11

## 2018-07-28 RX ADMIN — AMLODIPINE BESYLATE 10 MILLIGRAM(S): 2.5 TABLET ORAL at 06:11

## 2018-07-28 RX ADMIN — CLOPIDOGREL BISULFATE 75 MILLIGRAM(S): 75 TABLET, FILM COATED ORAL at 10:38

## 2018-07-28 NOTE — PROGRESS NOTE ADULT - SUBJECTIVE AND OBJECTIVE BOX
INTERVENTIONAL CARDIOLOGY FOLLOW UP NOTE    -Patient seen and examined this am    -No events overnight    -No complaints this am    VASCULAR ACCESS EXAM:    RADIAL:    2+ right/left radial pulse    Normal capillary refill. No evidence of motor or sensory deficits of digits, hand, wrist or forearm.    -Right radial Access site clean, non-tender, without ecchymosis or hematoma.    A/P: Patient is a 54 y/o male active smoker with PMHx of HTN, DM, MR, PVD (s/p fem-pop bypass bilaterally), hx of hyperkalemia with ACE/ARB therapy, obesity, with recent hospitalization in [facility] for "collapsed lung" now PCI of proximal LAD with LEE via right radial approach with no evidence of vascular complications post procedure.    -continue with current medications including dual antiplatelet therapy    -discharge instructions as per protocol    -outpatient follow up with primary cardiologist

## 2018-07-28 NOTE — DISCHARGE NOTE ADULT - PATIENT PORTAL LINK FT
You can access the [facility] Patient Portal, offered by [facility], by registering with the following website: [email or web address]

## 2018-07-28 NOTE — DISCHARGE NOTE ADULT - HOSPITAL COURSE
Patient is a 54 y/o male active smoker with PMHx of HTN, DM, MR, PVD (s/p fem-pop bypass bilaterally), hx of hyperkalemia with ACE/ARB therapy, obesity, with recent hospitalization in [facility] for "collapsed lung" and dyspnea and was referred to Dr. De La Rosa for stress test. Pt states for the past three days he has been having constant left sided chest tightness 4/10 severity that radiates to his back that is associated with SOB and worsened with exertion but also occurs independent of activity. Pt reports that he thinks that pain started secondary to being nervous about upcoming staged PCI. Pt denies palpitations, syncope, PND/orthopnea or LE edema.  NST 06/21/2018: EF 53%, moderate sized area of anterior apical and inferior wall ischemia, LV wall motion is normal. In light of pt's risk factors, new CCS IV anginal symptoms, and abnormal stress test and known residual disease pt returns for staged PCI. s/p cardiac catheterization (7/27/2018): 50% dLM (s/p IVUS), pLAD 85% (s/p IVUS), 90% OM1 stenosis, pRCA 80%, 80% mRCA, dRCA 80%. Intervention:  IVUS/PTCA/LEE pLAD. Plan for staged intervention of RCA lesion at later date. Radial @ 11:30 AM. Patient will follow up with Dr. Gary De La Rosa. Patient is a 56 y/o male active smoker with PMHx of HTN, DM, MR, PVD (s/p fem-pop bypass bilaterally), hx of hyperkalemia with ACE/ARB therapy, obesity, with recent hospitalization in [facility] for "collapsed lung" and dyspnea and was referred to Dr. De La Rosa for stress test. Pt states for the past three days he has been having constant left sided chest tightness 4/10 severity that radiates to his back that is associated with SOB and worsened with exertion but also occurs independent of activity. Pt reports that he thinks that pain started secondary to being nervous about upcoming staged PCI. Pt denies palpitations, syncope, PND/orthopnea or LE edema.  NST 06/21/2018: EF 53%, moderate sized area of anterior apical and inferior wall ischemia, LV wall motion is normal. In light of pt's risk factors, new CCS IV anginal symptoms, and abnormal stress test and known residual disease pt returns for staged PCI. Pt s/p cardiac catheterization (7/27/2018): 50% dLM (s/p IVUS), pLAD 85% (s/p IVUS), 90% OM1 stenosis, pRCA 80%, 80% mRCA, dRCA 80%. Intervention:  IVUS/PTCA/LEE pLAD. Plan for staged intervention of RCA lesion at later date. Radial @ 11:30 AM.  Pt admitted to 5 uris overnight for observation. No events on telemetry overnight, labs checked. Right radial site stable without bleeding, hematoma, distal pulse +2.  Pt to be discharged with refills for aspirin and plavis. Amlodipine increased to 10mg PO daily and hydralazine increased to 50 mg TID and prescritions sent to patient's pharmacy. Pt deemed stable for discharge per Dr. Talavera and will follow up with Dr. Gary De La Rosa within 1-2 weeks.

## 2018-07-28 NOTE — DISCHARGE NOTE ADULT - PLAN OF CARE
Please continue aspirin 81 mg oral daily and plavix 75 mg oral daily. You underwent a cardiac catheterization and received a sent to one of the coronary arteries. There is a remaining blockage in another artery that you will need to return at a later date for a stent. The procedure was done through the right wrist.  You do not need to keep this area covered and you may shower.  Please avoid any heavy lifting  (no more than 3 to 5 lbs) or strenuous activity for five days  If you develop any swelling, bleeding, hardening of the skin (hematoma formation), acute pain, numbness/tingling  in your arm or leg please contact your doctor immediately or call our 24/7 line: [phone]    Refills were sent to your pharmacy   NEVER MISS A DOSE OF ASPIRIN OR PLAVIX; IF YOU DO, YOU ARE AT RISK OF YOUR STENT CLOSING AND HAVING A HEART ATTACK. DO NOT STOP THESE TWO MEDICATIONS UNLESS INSTRUCTED TO DO SO BY YOUR CARDIOLOGIST    Please follow up with Dr. Gary De La Rosa in 1-2 weeks of discharge. Please start amlodipine (Norvasc) 10 mg oral daily and hydralazine 50 mg oral three times day We made the above adjustments to your home blood pressure medications. Prescriptions for the new doses were sent to your pharmacy. Please continue crestor 40 mg oral daily

## 2018-07-28 NOTE — DISCHARGE NOTE ADULT - CARE PLAN
Principal Discharge DX:	CAD (coronary artery disease)  Goal:	Please continue aspirin 81 mg oral daily and plavix 75 mg oral daily.  Assessment and plan of treatment:	You underwent a cardiac catheterization and received a sent to one of the coronary arteries. There is a remaining blockage in another artery that you will need to return at a later date for a stent. The procedure was done through the right wrist.  You do not need to keep this area covered and you may shower.  Please avoid any heavy lifting  (no more than 3 to 5 lbs) or strenuous activity for five days  If you develop any swelling, bleeding, hardening of the skin (hematoma formation), acute pain, numbness/tingling  in your arm or leg please contact your doctor immediately or call our 24/7 line: [phone]    Refills were sent to your pharmacy   NEVER MISS A DOSE OF ASPIRIN OR PLAVIX; IF YOU DO, YOU ARE AT RISK OF YOUR STENT CLOSING AND HAVING A HEART ATTACK. DO NOT STOP THESE TWO MEDICATIONS UNLESS INSTRUCTED TO DO SO BY YOUR CARDIOLOGIST    Please follow up with Dr. Gary De La Rosa in 1-2 weeks of discharge.  Secondary Diagnosis:	HTN (hypertension)  Goal:	Please start amlodipine (Norvasc) 10 mg oral daily and hydralazine 50 mg oral three times day  Assessment and plan of treatment:	We made the above adjustments to your home blood pressure medications. Prescriptions for the new doses were sent to your pharmacy.  Secondary Diagnosis:	Hyperlipidemia  Goal:	Please continue crestor 40 mg oral daily

## 2018-07-28 NOTE — DISCHARGE NOTE ADULT - INSTRUCTIONS
You do not need to keep this area covered and you may shower  Please avoid any heavy lifting  (no more than 3 to 5 lbs) or strenuous activity for five days  If you develop any swelling, bleeding, hardening of the skin (hematoma formation), acute pain, numbness/tingling  in your arm please contact your doctor immediately or call our 24/7 line: [phone]

## 2018-07-28 NOTE — DISCHARGE NOTE ADULT - CARE PROVIDER_API CALL
Gary De La Rosa (MD), Cardiovascular Disease; Internal Medicine  [address] [place] [address]  Phone: [phone]  Fax: [phone]

## 2018-07-28 NOTE — DISCHARGE NOTE ADULT - MEDICATION SUMMARY - MEDICATIONS TO STOP TAKING
I will STOP taking the medications listed below when I get home from the hospital:    amLODIPine 5 mg oral tablet  -- 1 tab(s) by mouth once a day    hydrALAZINE 25 mg oral tablet  -- 1 tab(s) by mouth every 8 hours

## 2018-08-02 DIAGNOSIS — I34.0 NONRHEUMATIC MITRAL (VALVE) INSUFFICIENCY: ICD-10-CM

## 2018-08-02 DIAGNOSIS — I10 ESSENTIAL (PRIMARY) HYPERTENSION: ICD-10-CM

## 2018-08-02 DIAGNOSIS — E78.5 HYPERLIPIDEMIA, UNSPECIFIED: ICD-10-CM

## 2018-08-02 DIAGNOSIS — J45.909 UNSPECIFIED ASTHMA, UNCOMPLICATED: ICD-10-CM

## 2018-08-02 DIAGNOSIS — Z90.49 ACQUIRED ABSENCE OF OTHER SPECIFIED PARTS OF DIGESTIVE TRACT: ICD-10-CM

## 2018-08-02 DIAGNOSIS — I25.110 ATHEROSCLEROTIC HEART DISEASE OF NATIVE CORONARY ARTERY WITH UNSTABLE ANGINA PECTORIS: ICD-10-CM

## 2018-08-02 DIAGNOSIS — Z79.4 LONG TERM (CURRENT) USE OF INSULIN: ICD-10-CM

## 2018-08-02 DIAGNOSIS — F17.210 NICOTINE DEPENDENCE, CIGARETTES, UNCOMPLICATED: ICD-10-CM

## 2018-08-02 DIAGNOSIS — K21.9 GASTRO-ESOPHAGEAL REFLUX DISEASE WITHOUT ESOPHAGITIS: ICD-10-CM

## 2018-08-02 DIAGNOSIS — Z79.02 LONG TERM (CURRENT) USE OF ANTITHROMBOTICS/ANTIPLATELETS: ICD-10-CM

## 2018-08-02 DIAGNOSIS — I73.9 PERIPHERAL VASCULAR DISEASE, UNSPECIFIED: ICD-10-CM

## 2018-08-02 DIAGNOSIS — Z79.82 LONG TERM (CURRENT) USE OF ASPIRIN: ICD-10-CM

## 2018-08-02 DIAGNOSIS — E11.9 TYPE 2 DIABETES MELLITUS WITHOUT COMPLICATIONS: ICD-10-CM

## 2018-08-02 DIAGNOSIS — I25.84 CORONARY ATHEROSCLEROSIS DUE TO CALCIFIED CORONARY LESION: ICD-10-CM

## 2018-09-07 VITALS
RESPIRATION RATE: 16 BRPM | DIASTOLIC BLOOD PRESSURE: 70 MMHG | OXYGEN SATURATION: 99 % | HEIGHT: 67 IN | WEIGHT: 200.4 LBS | HEART RATE: 76 BPM | TEMPERATURE: 99 F | SYSTOLIC BLOOD PRESSURE: 167 MMHG

## 2018-09-07 RX ORDER — CHLORHEXIDINE GLUCONATE 213 G/1000ML
1 SOLUTION TOPICAL ONCE
Qty: 0 | Refills: 0 | Status: DISCONTINUED | OUTPATIENT
Start: 2018-09-18 | End: 2018-09-19

## 2018-09-07 NOTE — H&P ADULT - HISTORY OF PRESENT ILLNESS
Patient is a 56 y/o male active smoker with PMHx of HTN, DM, MR, PVD (s/p fem-pop bypass bilaterally), hx of hyperkalemia with ACE/ARB therapy, obesity, with recent hospitalization in [facility] for "collapsed lung" and dyspnea and was referred to Dr. De La Rosa for stress test. Pt states for the past three days he has been having constant left sided chest tightness 4/10 severity that radiates to his back that is associated with SOB and worsened with exertion but also occurs independent of activity. Pt reports that he thinks that pain started secondary to being nervous about upcoming staged PCI. Pt denies palpitations, syncope, PND/orthopnea or LE edema.  NST 06/21/2018: EF 53%, moderate sized area of anterior apical and inferior wall ischemia, LV wall motion is normal    Cardiac cath 07/17/2018: EF: 65%, EDP 18, dLM 50% stenosis, pLAD 85% stenosis, OM1 90% stenosis, pRCA 80%, mRCA 80%, dRCA 80% stenosis  In light of pt's risk factors, new CCS IV anginal symptoms, and abnormal stress test and known residual disease pt returns for staged PCI ***PT TO BRING IN ALL MEDS       55 y.o Male POOR HISTORIAN Current smoker with PMHx of Obesity, HTN, Hyperlipidemia, IDDM, PVD s/p fem-pop bypass B/L, Hyperkalemia with ACE-I/ARB therapy, Known CAD with prior PCI, most recently LEE pLAD @ [facility] on 07/27/18 with residual 50% dLM w/ MLA 5.1mm2 by IVUS, 90% OM1, 80% p/m/d RCA.    He returns today for Stage PCI of known residual disease.  Since his PCI, pt continues to c/o his initial presenting symptoms of constant left sided chest tightness 4/10 severity that radiates to his back that is associated with SOB and worsened with exertion but also occurs independent of activity. Denies palpitations, dizziness, syncope, recent PND/orthopnea, LE edema, or recent decline in his exercise tolerance.  Nuclear Stress test performed on 06/21/18 revealed moderate sized area of anterior apical and inferior wall ischemia, LVEF of 53%.       In light of pt’s risk factors, Known CAD, persistant above CCS Anginal Class 3 symptoms, pt now returns for Stage PCI of known residual lesion(s).          CATH HX:    Cardiac Cath @ [facility] on 07/17/18: 50%dLM, 85% pLAD, 90% OM1, 80% pRCA, 80% mRCA, 80% dRCA, LVEF of 65%.  Recommended for medical therapy        Cardiac Cath @ [facility] on 07/27/18: 50% dLM w/ MLA 5.1mm2 by IVUS, 85% pLAD w/ MLA <2mm2 by IVUS, 90% OM1, 80% pRCA, 80% mRCA, 80% dRCA.  Successful LEE pLAD ***PT TO BRING IN ALL MEDS       58 y.o Male POOR HISTORIAN Current smoker with PMHx of Obesity, HTN, Hyperlipidemia, IDDM, PVD s/p fem-pop bypass B/L, Hyperkalemia with ACE-I/ARB therapy, Known CAD with prior PCI, most recently LEE pLAD @ [facility] on 07/27/18 with residual 50% dLM w/ MLA 5.1mm2 by IVUS, 90% OM1, 80% p/m/d RCA.    He returns today for Stage PCI of known residual disease.  Since his PCI, pt continues to c/o his initial presenting symptoms of constant left sided chest tightness 4/10 severity that radiates to his back that is associated with SOB and worsened with exertion but also occurs independent of activity. Denies palpitations, dizziness, syncope, recent PND/orthopnea, LE edema, or recent decline in his exercise tolerance.  Nuclear Stress test performed on 06/21/18 revealed moderate sized area of anterior apical and inferior wall ischemia, LVEF of 53%.       In light of pt’s risk factors, Known CAD, persistant above CCS Anginal Class 3 symptoms, pt now returns for Stage PCI of known residual lesion(s).          CATH HX:    Cardiac Cath @ [facility] on 07/17/18: 50%dLM, 85% pLAD, 90% OM1, 80% pRCA, 80% mRCA, 80% dRCA, LVEF of 65%.  Recommended for medical therapy        Cardiac Cath @ [facility] on 07/27/18: 50% dLM w/ MLA 5.1mm2 by IVUS, 85% pLAD w/ MLA <2mm2 by IVUS, 90% OM1, 80% pRCA, 80% mRCA, 80% dRCA.  Successful LEE pLAD ***PT TO BRING IN ALL MEDS       58 y.o Male POOR HISTORIAN Current smoker with PMHx of Obesity, HTN, Hyperlipidemia, IDDM, CKD Stage 3 (Cr 1.4 09/13/18), Anemia (recently d/'c'd from [facility] w/ H/H of 8.3/27.0 and not transfused, reportedly negative GI workup done ~ 2yrs ago),  n 09/PVD s/p fem-pop bypass B/L, Hyperkalemia with ACE-I/ARB therapy, Known CAD with prior PCI, most recently LEE pLAD @ [facility] on 07/27/18 with residual 50% dLM w/ MLA 5.1mm2 by IVUS, 90% OM1, 80% p/m/d RCA.   He was scheduled for Stage PCI of known residual disease @ [facility] earlier this month but he went to [facility] for worsening SOB, abdominal distention, and LE edema.  C-xrya done showed b/L pleural effusions,     .  Since his PCI, pt continues to c/o his initial presenting symptoms of constant left sided chest tightness 4/10 severity that radiates to his back that is associated with SOB and worsened with exertion but also occurs independent of activity. Denies palpitations, dizziness, syncope, recent PND/orthopnea, LE edema, or recent decline in his exercise tolerance.  Nuclear Stress test performed on 06/21/18 revealed moderate sized area of anterior apical and inferior wall ischemia, LVEF of 53%.       In light of pt’s risk factors, Known CAD, persistant above CCS Anginal Class 3 symptoms, pt now returns for Stage PCI of known residual lesion(s).          CATH HX:    Cardiac Cath @ [facility] on 07/17/18: 50%dLM, 85% pLAD, 90% OM1, 80% pRCA, 80% mRCA, 80% dRCA, LVEF of 65%.  Recommended for medical therapy        Cardiac Cath @ [facility] on 07/27/18: 50% dLM w/ MLA 5.1mm2 by IVUS, 85% pLAD w/ MLA <2mm2 by IVUS, 90% OM1, 80% pRCA, 80% mRCA, 80% dRCA.  Successful LEE pLAD ***PT TO BRING IN ALL MEDS       58 y.o Male POOR HISTORIAN Current smoker with PMHx of Obesity, HTN, Hyperlipidemia, IDDM, CKD Stage 3 (Cr 1.4 09/13/18), Anemia (recently d/'c'd from [facility] w/ H/H of 8.3/27.0 and not transfused, reportedly negative GI workup done ~ 2yrs ago), PVD s/p fem-pop bypass B/L, Hyperkalemia with ACE-I/ARB therapy,  Chronic Diastolic  HF, Known CAD with prior PCI, most recently LEE pLAD @ [facility] on 07/27/18 with residual 50% dLM w/ MLA 5.1mm2 by IVUS, 90% OM1, 80% p/m/d RCA.   He was scheduled for Stage PCI of known residual disease @ [facility] earlier this month but presented to [facility] with acute on chronic diastolic HF and was diuresed and ultimately discharged home on Lasix BID.  Since, pt reports  mild left sided CP, 4/10 in severity with SOB upon minimal –moderate exertion, resolving within minutes of rest.  Denies palpitations, dizziness, syncope, recent PND/orthopnea, LE edema, or recent decline in his exercise tolerance.  Nuclear Stress test performed on 06/21/18 revealed moderate sized area of anterior apical and inferior wall ischemia, LVEF of 53%.       In light of pt’s risk factors, Known CAD, persistant above CCS Anginal Class 3 symptoms, pt now returns for Stage PCI of known residual lesion(s).          CATH HX:    Cardiac Cath @ [facility] on 07/17/18: 50%dLM, 85% pLAD, 90% OM1, 80% pRCA, 80% mRCA, 80% dRCA, LVEF of 65%.  Recommended for medical therapy        Cardiac Cath @ [facility] on 07/27/18: 50% dLM w/ MLA 5.1mm2 by IVUS, 85% pLAD w/ MLA <2mm2 by IVUS, 90% OM1, 80% pRCA, 80% mRCA, 80% dRCA.  Successful LEE pLAD 58 y.o Male POOR HISTORIAN Current smoker with PMHx of Obesity, HTN, Hyperlipidemia, IDDM, CKD Stage 3 (Cr 1.4 09/13/18), Anemia (recently d/'c'd from [facility] w/ H/H of 8.3/27.0 and not transfused, reportedly negative GI workup done ~ 2yrs ago), PVD s/p fem-pop bypass B/L, Hyperkalemia with ACE-I/ARB therapy,  Chronic Diastolic  HF, Known CAD with prior PCI, most recently LEE pLAD @ [facility] on 07/27/18 with residual 50% dLM w/ MLA 5.1mm2 by IVUS, 90% OM1, 80% p/m/d RCA.   He was scheduled for Stage PCI of known residual disease @ [facility] earlier this month but presented to [facility] with acute on chronic diastolic HF and was diuresed and ultimately discharged home on Lasix BID.  Since, pt reports  mild left sided CP, 4/10 in severity with SOB upon minimal –moderate exertion, resolving within minutes of rest.  Denies palpitations, dizziness, syncope, recent PND/orthopnea, LE edema, or recent decline in his exercise tolerance.  Nuclear Stress test performed on 06/21/18 revealed moderate sized area of anterior apical and inferior wall ischemia, LVEF of 53%.       In light of pt’s risk factors, Known CAD, persistant above CCS Anginal Class 3 symptoms, pt now returns for Stage PCI of known residual lesion(s).          CATH HX:    Cardiac Cath @ [facility] on 07/17/18: 50%dLM, 85% pLAD, 90% OM1, 80% pRCA, 80% mRCA, 80% dRCA, LVEF of 65%.  Recommended for medical therapy        Cardiac Cath @ [facility] on 07/27/18: 50% dLM w/ MLA 5.1mm2 by IVUS, 85% pLAD w/ MLA <2mm2 by IVUS, 90% OM1, 80% pRCA, 80% mRCA, 80% dRCA.  Successful LEE pLAD

## 2018-09-07 NOTE — H&P ADULT - ASSESSMENT
58 y.o Male POOR HISTORIAN Current smoker with PMHx of Obesity, HTN, Hyperlipidemia, IDDM, CKD Stage 3 (Cr 1.4 09/13/18), Anemia (recently d/'c'd from [facility] w/ H/H of 8.3/27.0 and not transfused, reportedly negative GI workup done ~ 2yrs ago), PVD s/p fem-pop bypass B/L, Hyperkalemia with ACE-I/ARB therapy,  Chronic Diastolic  HF, Known CAD with prior PCI, most recently LEE pLAD @ [facility] on 07/27/18 with residual 50% dLM w/ MLA 5.1mm2 by IVUS, 90% OM1, 80% p/m/d RCA who presented to [facility] for cardiac cath for staged PCI in light of pt's risk factors, known residual disease, CCS 3 anginal symptoms.      ASA III and Mallampati III    OF NOTE: pt was loaded with  mg PO x1 and Plavix 75 mg PO x1 prior to procedure.    Risks & benefits of procedure and alternative therapy have been explained to the patient including but not limited to: allergic reaction, bleeding w/possible need for blood transfusion, infection, renal and vascular compromise, limb damage, arrhythmia, stroke, vessel dissection/perforation, Myocardial infarction, emergent CABG. Informed consent obtained and in chart.

## 2018-09-07 NOTE — H&P ADULT - PMH
Diabetes    HTN (hypertension)    Mitral regurgitation    Obesity    PVD (peripheral vascular disease) Coronary artery disease    Diabetes    HTN (hypertension)    Mitral regurgitation    Obesity    PVD (peripheral vascular disease)

## 2018-09-10 ENCOUNTER — INPATIENT (INPATIENT)
Facility: HOSPITAL | Age: 58
LOS: 3 days | Discharge: HOME | DRG: 292 | End: 2018-09-14
Attending: HOSPITALIST | Admitting: HOSPITALIST
Payer: MEDICAID

## 2018-09-10 VITALS
HEIGHT: 67 IN | RESPIRATION RATE: 18 BRPM | SYSTOLIC BLOOD PRESSURE: 120 MMHG | WEIGHT: 199.96 LBS | OXYGEN SATURATION: 98 % | TEMPERATURE: 98 F | DIASTOLIC BLOOD PRESSURE: 66 MMHG | HEART RATE: 78 BPM

## 2018-09-10 DIAGNOSIS — Z90.49 ACQUIRED ABSENCE OF OTHER SPECIFIED PARTS OF DIGESTIVE TRACT: Chronic | ICD-10-CM

## 2018-09-10 DIAGNOSIS — I50.9 HEART FAILURE, UNSPECIFIED: ICD-10-CM

## 2018-09-10 DIAGNOSIS — Z95.828 PRESENCE OF OTHER VASCULAR IMPLANTS AND GRAFTS: Chronic | ICD-10-CM

## 2018-09-10 PROBLEM — I25.10 ATHEROSCLEROTIC HEART DISEASE OF NATIVE CORONARY ARTERY WITHOUT ANGINA PECTORIS: Chronic | Status: ACTIVE | Noted: 2018-09-07

## 2018-09-10 LAB
ALBUMIN SERPL ELPH-MCNC: 4.1 G/DL — SIGNIFICANT CHANGE UP (ref 3.3–5)
ALP SERPL-CCNC: 106 U/L — SIGNIFICANT CHANGE UP (ref 40–120)
ALT FLD-CCNC: 20 U/L — SIGNIFICANT CHANGE UP (ref 10–45)
ANION GAP SERPL CALC-SCNC: 15 MMOL/L — SIGNIFICANT CHANGE UP (ref 5–17)
ANISOCYTOSIS BLD QL: SLIGHT — SIGNIFICANT CHANGE UP
AST SERPL-CCNC: 39 U/L — SIGNIFICANT CHANGE UP (ref 10–40)
BASE EXCESS BLDV CALC-SCNC: -2.8 MMOL/L — LOW (ref -2–2)
BASOPHILS # BLD AUTO: 0 K/UL — SIGNIFICANT CHANGE UP (ref 0–0.2)
BASOPHILS NFR BLD AUTO: 0.2 % — SIGNIFICANT CHANGE UP (ref 0–2)
BILIRUB SERPL-MCNC: 0.4 MG/DL — SIGNIFICANT CHANGE UP (ref 0.2–1.2)
BUN SERPL-MCNC: 20 MG/DL — SIGNIFICANT CHANGE UP (ref 7–23)
CA-I SERPL-SCNC: 1.22 MMOL/L — SIGNIFICANT CHANGE UP (ref 1.12–1.3)
CALCIUM SERPL-MCNC: 9.1 MG/DL — SIGNIFICANT CHANGE UP (ref 8.4–10.5)
CHLORIDE BLDV-SCNC: 108 MMOL/L — SIGNIFICANT CHANGE UP (ref 96–108)
CHLORIDE SERPL-SCNC: 105 MMOL/L — SIGNIFICANT CHANGE UP (ref 96–108)
CO2 BLDV-SCNC: 24 MMOL/L — SIGNIFICANT CHANGE UP (ref 22–30)
CO2 SERPL-SCNC: 18 MMOL/L — LOW (ref 22–31)
CREAT SERPL-MCNC: 1.12 MG/DL — SIGNIFICANT CHANGE UP (ref 0.5–1.3)
DACRYOCYTES BLD QL SMEAR: SLIGHT — SIGNIFICANT CHANGE UP
ELLIPTOCYTES BLD QL SMEAR: SLIGHT — SIGNIFICANT CHANGE UP
EOSINOPHIL # BLD AUTO: 0.2 K/UL — SIGNIFICANT CHANGE UP (ref 0–0.5)
EOSINOPHIL NFR BLD AUTO: 1.3 % — SIGNIFICANT CHANGE UP (ref 0–6)
GAS PNL BLDV: 140 MMOL/L — SIGNIFICANT CHANGE UP (ref 136–145)
GAS PNL BLDV: SIGNIFICANT CHANGE UP
GLUCOSE BLDV-MCNC: 87 MG/DL — SIGNIFICANT CHANGE UP (ref 70–99)
GLUCOSE SERPL-MCNC: 88 MG/DL — SIGNIFICANT CHANGE UP (ref 70–99)
HCO3 BLDV-SCNC: 22 MMOL/L — SIGNIFICANT CHANGE UP (ref 21–29)
HCT VFR BLD CALC: 29.3 % — LOW (ref 39–50)
HCT VFR BLDA CALC: 25 % — LOW (ref 39–50)
HGB BLD CALC-MCNC: 8 G/DL — LOW (ref 13–17)
HGB BLD-MCNC: 9 G/DL — LOW (ref 13–17)
HYPOCHROMIA BLD QL: SLIGHT — SIGNIFICANT CHANGE UP
LACTATE BLDV-MCNC: 2.8 MMOL/L — HIGH (ref 0.7–2)
LYMPHOCYTES # BLD AUTO: 24.4 % — SIGNIFICANT CHANGE UP (ref 13–44)
LYMPHOCYTES # BLD AUTO: 3.1 K/UL — SIGNIFICANT CHANGE UP (ref 1–3.3)
MCHC RBC-ENTMCNC: 21.6 PG — LOW (ref 27–34)
MCHC RBC-ENTMCNC: 30.9 GM/DL — LOW (ref 32–36)
MCV RBC AUTO: 70.1 FL — LOW (ref 80–100)
MICROCYTES BLD QL: SLIGHT — SIGNIFICANT CHANGE UP
MONOCYTES # BLD AUTO: 1.5 K/UL — HIGH (ref 0–0.9)
MONOCYTES NFR BLD AUTO: 11.5 % — SIGNIFICANT CHANGE UP (ref 2–14)
NEUTROPHILS # BLD AUTO: 8 K/UL — HIGH (ref 1.8–7.4)
NEUTROPHILS NFR BLD AUTO: 62.6 % — SIGNIFICANT CHANGE UP (ref 43–77)
NT-PROBNP SERPL-SCNC: 1167 PG/ML — HIGH (ref 0–300)
PCO2 BLDV: 43 MMHG — SIGNIFICANT CHANGE UP (ref 35–50)
PH BLDV: 7.33 — LOW (ref 7.35–7.45)
PLAT MORPH BLD: NORMAL — SIGNIFICANT CHANGE UP
PLATELET # BLD AUTO: 190 K/UL — SIGNIFICANT CHANGE UP (ref 150–400)
PO2 BLDV: 47 MMHG — HIGH (ref 25–45)
POIKILOCYTOSIS BLD QL AUTO: SLIGHT — SIGNIFICANT CHANGE UP
POLYCHROMASIA BLD QL SMEAR: SLIGHT — SIGNIFICANT CHANGE UP
POTASSIUM BLDV-SCNC: 5.2 MMOL/L — SIGNIFICANT CHANGE UP (ref 3.5–5.3)
POTASSIUM SERPL-MCNC: 4.7 MMOL/L — SIGNIFICANT CHANGE UP (ref 3.5–5.3)
POTASSIUM SERPL-SCNC: 4.7 MMOL/L — SIGNIFICANT CHANGE UP (ref 3.5–5.3)
PROT SERPL-MCNC: 8.5 G/DL — HIGH (ref 6–8.3)
RBC # BLD: 4.18 M/UL — LOW (ref 4.2–5.8)
RBC # FLD: 16.7 % — HIGH (ref 10.3–14.5)
RBC BLD AUTO: ABNORMAL
SAO2 % BLDV: 77 % — SIGNIFICANT CHANGE UP (ref 67–88)
SODIUM SERPL-SCNC: 138 MMOL/L — SIGNIFICANT CHANGE UP (ref 135–145)
TROPONIN T, HIGH SENSITIVITY RESULT: 31 NG/L — SIGNIFICANT CHANGE UP (ref 0–51)
WBC # BLD: 12.8 K/UL — HIGH (ref 3.8–10.5)
WBC # FLD AUTO: 12.8 K/UL — HIGH (ref 3.8–10.5)

## 2018-09-10 PROCEDURE — 99285 EMERGENCY DEPT VISIT HI MDM: CPT

## 2018-09-10 PROCEDURE — 99223 1ST HOSP IP/OBS HIGH 75: CPT | Mod: GC

## 2018-09-10 PROCEDURE — 99223 1ST HOSP IP/OBS HIGH 75: CPT

## 2018-09-10 PROCEDURE — 71046 X-RAY EXAM CHEST 2 VIEWS: CPT | Mod: 26

## 2018-09-10 RX ORDER — FUROSEMIDE 40 MG
20 TABLET ORAL ONCE
Qty: 0 | Refills: 0 | Status: COMPLETED | OUTPATIENT
Start: 2018-09-10 | End: 2018-09-10

## 2018-09-10 RX ORDER — INFLUENZA VIRUS VACCINE 15; 15; 15; 15 UG/.5ML; UG/.5ML; UG/.5ML; UG/.5ML
0.5 SUSPENSION INTRAMUSCULAR ONCE
Qty: 0 | Refills: 0 | Status: COMPLETED | OUTPATIENT
Start: 2018-09-10 | End: 2018-09-14

## 2018-09-10 RX ADMIN — Medication 20 MILLIGRAM(S): at 16:44

## 2018-09-10 RX ADMIN — Medication 20 MILLIGRAM(S): at 18:19

## 2018-09-10 NOTE — ED PROVIDER NOTE - OBJECTIVE STATEMENT
58M h/o CAD (1 stent placement 6/2018), HTN, PAD presents with 2 weeks of progressive SOB, seen at [facility], had cardiac cath done with one stent placed at [facility] ,supposed to get 2 more stents placed tomorrow but last 2 days SOB worsened. Also admits to b/l leg edema, worsening. No fevers, chills, unilateral leg swelling, abdominal pain, /GI complaints. 58M h/o CAD (1 stent placement 6/2018), HTN, PAD presents with 2 weeks of progressive SOB, seen at [facility], had cardiac cath done with one stent placed at [facility] ,supposed to get 2 more stents placed tomorrow but last 2 days SOB worsened. Also admits to b/l leg edema, worsening. No fevers, chills, unilateral leg swelling, abdominal pain, /GI complaints.    Cardiologist: Dr. Gary De La Rosa [phone]

## 2018-09-10 NOTE — H&P ADULT - ASSESSMENT
59 yo male with PMH of ?COPD, CAD w/ recent PCI in June 2018, HTN, PVD b/l fem/pop bypass, DM presents here with SOB and abdominal swelling.

## 2018-09-10 NOTE — H&P ADULT - PROBLEM SELECTOR PLAN 3
Patient with abdominal distension, ?ascites  will check US abdomen for further assessment  c/w lasix Patient with abdominal distension, ?ascites  will check US abdomen for further assessment  c/w lasix  may need diagnostic/therapeutic paracentesis

## 2018-09-10 NOTE — CONSULT NOTE ADULT - ASSESSMENT
58 year old M with a PMH of CAD w/ recent PCI (2018), HTN, PVD (b/l fem bypass), and DM that presents with worsening SOB and LE edema likely 2/2 CHF  [place]: diagnostic cardiac catheterization which revealed 40% dLM lesion (IVUS 5.0mm2), 85% pLAD lesion (IVUS <2.0mm2), 90% OM1 lesion, 80% p/m/dRCA lesion, EF:65%, EDP 18mmHg.    #SOB likely 2/2 CHF  - Cardiac enzymes negative, likely not acute ischemic process  - CXR showing pleff, and w/ LE and SOB pt likely has CHF. Although pts Lungs are clear, however, my examination was after IV lasix.   - Start IV lasix 40 BID  - Consider BiPAP for WOB.   - Check TTE  - could check abd US to see if pt could benefit from diagnostic / therapeutic tap.   - Monitor daily weights  - Check Is and Os  - Will plan for cath after pts resp status is more stable.     #CAD  - C/w ASA and plavix   - start atorvastatin 40 mg daily.     Hussein Núñez MD  Cardiology Fellow - PGY-4  LIFIFI: [id number]  NS: [phone]  [id number] 58 year old M with a PMH of CAD w/ recent PCI (2018), HTN, PVD (b/l fem bypass), and DM that presents with worsening SOB and LE edema likely 2/2 CHF  [place]: diagnostic cardiac catheterization which revealed 40% dLM lesion (IVUS 5.0mm2), 85% pLAD lesion (IVUS <2.0mm2), 90% OM1 lesion, 80% p/m/dRCA lesion, EF:65%, EDP 18mmHg.    #SOB likely 2/2 CHF  - Cardiac enzymes negative, likely not acute ischemic process  - CXR showing pleff, and w/ LE and SOB pt likely has CHF. Although pts Lungs are clear, however, my examination was after IV lasix.   - Start IV lasix 40 BID  - Consider BiPAP for WOB.   - Check TTE  - could check abd US to see if pt could benefit from diagnostic / therapeutic tap.   - Monitor daily weights  - Check Is and Os  - Will plan for cath after pts resp status is more stable.     #CAD  - C/w ASA and plavix   - start atorvastatin 40 mg daily.     Hussein Núñez MD  Cardiology Fellow - PGY-4  [place]: [id number]  NS: [phone]  [id number]    Barrie Pacheco M.D.  Cardiology Consult Service  [phone] or [phone]

## 2018-09-10 NOTE — ED ADULT NURSE NOTE - NSIMPLEMENTINTERV_GEN_ALL_ED
Implemented All Universal Safety Interventions:  [place] to call system. Call bell, personal items and telephone within reach. Instruct patient to call for assistance. Room bathroom lighting operational. Non-slip footwear when patient is off stretcher. Physically safe environment: no spills, clutter or unnecessary equipment. Stretcher in lowest position, wheels locked, appropriate side rails in place.

## 2018-09-10 NOTE — H&P ADULT - PROBLEM SELECTOR PLAN 1
Patient with likely CHF exacerbation, though unclear previous history of CHF (not been on diuretics) Likely 2/2 CHF exacerbation and/or increasing abdominal girth  c/w IV lasix 40mg bid  check echo  check US abdomen

## 2018-09-10 NOTE — H&P ADULT - NSHPPHYSICALEXAM_GEN_ALL_CORE
PHYSICAL EXAM:  Vital Signs Last 24 Hrs  T(C): 37.2 (09-10-18 @ 21:15)  T(F): 98.9 (09-10-18 @ 21:15), Max: 98.9 (09-10-18 @ 21:15)  HR: 81 (09-10-18 @ 21:15) (76 - 81)  BP: 154/75 (09-10-18 @ 21:15)  BP(mean): --  RR: 18 (09-10-18 @ 21:15) (18 - 18)  SpO2: 100% (09-10-18 @ 21:15) (97% - 100%)  Wt(kg): --    Constitutional: NAD, awake and alert, obese  EYES: EOMI  ENT:  Normal Hearing, no tonsillar exudates   Neck: Soft and supple, No JVD  Lungs: decreased breath sound right base, no wheezing  Heart: S1 and S2, regular rate and rhythm, no Murmurs, gallops or rubs  Abdomen: Bowel Sounds present, soft, nontender, +abdominal distention   Extremities: No cyanosis or clubbing; warm to touch; +3 edema b/l LE  Vascular: 2+ peripheral pulses lower ex  Neurological: A/O x 3, no focal deficits  Musculoskeletal: 5/5 strength b/l upper and lower extremities  Skin: No rashes  Psych: no depression or anhedonia  HEME: no bruises, no nose bleeds PHYSICAL EXAM:  Vital Signs Last 24 Hrs  T(C): 37.2 (09-10-18 @ 21:15)  T(F): 98.9 (09-10-18 @ 21:15), Max: 98.9 (09-10-18 @ 21:15)  HR: 81 (09-10-18 @ 21:15) (76 - 81)  BP: 154/75 (09-10-18 @ 21:15)  BP(mean): --  RR: 18 (09-10-18 @ 21:15) (18 - 18)  SpO2: 100% (09-10-18 @ 21:15) (97% - 100%)  Wt(kg): --    Constitutional: NAD, awake and alert, obese  EYES: EOMI  ENT:  Normal Hearing, no tonsillar exudates   Neck: Soft and supple, No JVD  Lungs: decreased breath sound right base, no wheezing  Heart: S1 and S2, regular rate and rhythm, no Murmurs, gallops or rubs  Abdomen: Bowel Sounds present, soft, nontender, +abdominal distention ; +ascites  Extremities: No cyanosis or clubbing; warm to touch; +3 edema b/l LE  Vascular: 2+ peripheral pulses lower ex  Neurological: A/O x 3, no focal deficits  Musculoskeletal: 5/5 strength b/l upper and lower extremities  Skin: No rashes  Psych: no depression or anhedonia  HEME: no bruises, no nose bleeds

## 2018-09-10 NOTE — ED ADULT NURSE NOTE - OBJECTIVE STATEMENT
58 yr old male to ed c/o dyspnea at rest and exertion Sleeps with 2-3 pillow at night Had stent july 2018 and bilat leg bypass 2017 and 2016. C/o intermittent chest pain . Denies pain at this time Abd grossly distended with pedal edema +3 Spouse at bedside. 58 yr old male to ed c/o dyspnea at rest and exertion Sleeps with 2-3 pillow at night Had stent july 2018 and bilat leg bypass 2017 and 2016. C/o intermittent chest pain . Denies pain at this time Abd grossly distended with pedal edema +3 Spouse at bedside. Denies fever, body aches or chills .H/o diabetes HTN 58 yr old male to ed c/o dyspnea at rest and exertion Sleeps with 2-3 pillow at night Had stent july 2018 and bilat leg bypass 2017 and 2016. C/o intermittent chest pain . Denies pain at this time Abd grossly distended with pedal edema +3 Spouse at bedside. Denies fever, body aches or chills .H/o diabetes HTN. Denies burn or diff urinating Denies decrease of urine.

## 2018-09-10 NOTE — H&P ADULT - HISTORY OF PRESENT ILLNESS
57 yo male with PMH of ?COPD, CAD w/ recent PCI in June 2018, HTN, PVD b/l fem/pop bypass, DM presents here with SOB and abdominal swelling.  Patient states that he has been having SOB for last few months, went to [facility] in May when he was told that he had mild heart attack.  He was referred to a cardiologist who sent him for cardiac cath at [facility].  Patient states he was found to have "3 blockage", had one stent placed.  He was also told that he had a "weak heart", but he was not given any prescription for water pills.  Patient was supposed to return tomorrow for 2 more stent placement.  However, over the last few days, patient noticed increasing SOB.  He can't walk more than a few steps without having dyspnea and feeling tired.  He can't shower without becoming exhausted.  He has worsening LE edema and increasing abdominal swelling.  He was using 2 pillows before, now requiring 3 pillows.  He also has PND.  He weight 180lb 2 months ago, but now weighs 205lbs.  Patient otherwise denies cough, chest pain, fever, URI symptoms.  Denies urinary or or bowel symptoms. 59 yo male with PMH of ?COPD, CAD w/ recent PCI in June 2018, HTN, PAD b/l fem/pop bypass, DM presents here with SOB and abdominal swelling.  Patient states that he has been having SOB for last few months, went to [facility] in May when he was told that he had mild heart attack.  He was referred to a cardiologist who sent him for cardiac cath at [facility].  Patient states he was found to have "3 blockage", had one stent placed.  He was also told that he had a "weak heart", but he was not given any prescription for water pills.  Patient was supposed to return tomorrow for 2 more stent placement.  However, over the last few days, patient noticed increasing SOB.  He can't walk more than a few steps without having dyspnea and feeling tired.  He can't shower without becoming exhausted.  He has worsening LE edema and increasing abdominal swelling.  He was using 2 pillows before, now requiring 3 pillows.  He also has PND.  He weight 180lb 2 months ago, but now weighs 205lbs.  Patient otherwise denies cough, chest pain, fever, URI symptoms.  Denies urinary or or bowel symptoms.

## 2018-09-10 NOTE — H&P ADULT - NSHPREVIEWOFSYSTEMS_GEN_ALL_CORE
CONSTITUTIONAL: +generalized weakness  EYES/ENT: No visual changes;  No dysphagia  NECK: No pain or stiffness  RESPIRATORY: No cough, wheezing, hemoptysis; +SOB with minimal exertion  CARDIOVASCULAR: No chest pain or palpitations; + lower extremity edema  EXTREMITIES: no cyanosis, clubbing  MUSCULOSKELETAL: no joint pain, swelling  GASTROINTESTINAL: +abdominal swelling, distention, no nausea or vomiting. no constipation or diarrhea  BACK: No back pain  GENITOURINARY: No dysuria, frequency or hematuria  NEUROLOGICAL: No numbness or weakness  SKIN: No itching, burning, rashes, or lesions   PSYCH: no agitation  All other review of systems is negative unless indicated above.

## 2018-09-10 NOTE — ED ADULT NURSE NOTE - CHIEF COMPLAINT QUOTE
shortness of breathe since June, got worse today, was admitted in [facility]. Had cardiac stent July 17 in [facility], scheduled for another stent tomorrow.

## 2018-09-10 NOTE — ED PROVIDER NOTE - ATTENDING CONTRIBUTION TO CARE
Attending MD Duncan: 58M with PMH including CAD s/p stent, HTN, peripheral artery disease presents to the ED with shortness of breath. Attending MD Duncan: 58M with PMH including CAD s/p stent, HTN, peripheral artery disease presents to the ED with shortness of breath.  Reports had one stent at [facility] and has been experiencing increasing shortness of breath, bilateral lower extremity edema.  Reports is supposed to have two more stents placed tomorrow but decided to come in today because of worsening symptoms.  Denies fevers, chills.  Reports cards Dr. De La Rosa.  PS L IHR, ventral hernia repair.  Reports compliance with meds.  Denies allergies.  Denies ETOH, tobacco, drugs.  Denies palpitations.  Denies abdominal pain, nausea, vomiting, diarrhea, blood in stools. Denies urinary complaints.  Denies fevers, chills. On exam, head NCAT, ranging neck freely, moderate respiratory distress, using accessory muscles, lungs with decreased breath sounds at bases, no wheezing, scattered crackles at R base, +S1S2, +murmur, no r/g, abdomen soft with +BS, NT, ND, no CVAT, bilateral lower extremities with 3+ pitting edema, moving all extremities; A/P: 58M with worsening shortness of breath and LE edema, Ddx includes CHF, lower suspicion for infectious etiology such as PNA/viral URI, unstable angina, will obtain CXR, EKG, labs, diurese, cards, will need admission Attending MD Duncan: 58M with PMH including CAD s/p stent, HTN, peripheral artery disease presents to the ED with shortness of breath.  Yancy had one stent at [facility] and has been experiencing increasing shortness of breath, bilateral lower extremity edema.  Yancy is supposed to have two more stents placed tomorrow but decided to come in today because of worsening symptoms.  Denies fevers, chills.  Reports cards Dr. De La Rosa.  Reports compliance with meds.  Denies allergies.  Reports not since June ETOH, not since June tobacco, denies ever drugs.  Denies palpitations.  Denies abdominal pain, nausea, vomiting, diarrhea, blood in stools. Denies urinary complaints.  Denies fevers, chills. On exam, head NCAT, ranging neck freely, moderate respiratory distress, using accessory muscles, lungs with decreased breath sounds at bases, no wheezing, scattered crackles at R base, +S1S2, +murmur, no r/g, abdomen soft with +BS, NT, ND, no CVAT, bilateral lower extremities with 3+ pitting edema, moving all extremities; A/P: 58M with worsening shortness of breath and LE edema, Ddx includes CHF, lower suspicion for infectious etiology such as PNA/viral URI, unstable angina, will obtain CXR, EKG, labs, diurese, cards, will need admission

## 2018-09-10 NOTE — ED PROVIDER NOTE - PHYSICAL EXAMINATION
Negar Davison DO.:   GENERAL: Patient awake alert NAD. Moderate respiratory distress, using accessory muscles.  HEENT: Moist mucous membranes, PERRL, EOMI  LUNGS: Diminished BS b/l at bases, no wheezes or crackles.   CARDIAC: RRR, no m/r/g.    ABDOMEN: Soft, NT, ND, No rebound, guarding. No CVA tenderness.   EXT: +3+ pitting edema. No calf tenderness.  NEURO: A&Ox3. Gait normal.    SKIN: Warm and dry. No rash.

## 2018-09-10 NOTE — ED PROVIDER NOTE - MEDICAL DECISION MAKING DETAILS
58M p/w SOB. POCUS shows B lines left side, pleural effusion on right. O2 sat RA 97. Likely CHF vs. COPD. Unlikely infectious. Lasix, labs. Admit.

## 2018-09-10 NOTE — H&P ADULT - PMH
Coronary artery disease    Diabetes    HTN (hypertension)    Mitral regurgitation    Obesity    PVD (peripheral vascular disease)    Stented coronary artery

## 2018-09-10 NOTE — H&P ADULT - NSHPLABSRESULTS_GEN_ALL_CORE
Labs personally reviewed:                          9.0    12.8  )-----------( 190      ( 10 Sep 2018 16:53 )             29.3     09-10    138  |  105  |  20  ----------------------------<  88  4.7   |  18<L>  |  1.12    Ca    9.1      10 Sep 2018 16:53    TPro  8.5<H>  /  Alb  4.1  /  TBili  0.4  /  DBili  x   /  AST  39  /  ALT  20  /  AlkPhos  106  09-10        LIVER FUNCTIONS - ( 10 Sep 2018 16:53 )  Alb: 4.1 g/dL / Pro: 8.5 g/dL / ALK PHOS: 106 U/L / ALT: 20 U/L / AST: 39 U/L / GGT: x               CAPILLARY BLOOD GLUCOSE          Imaging:  CXR personally reviewed: nright pleural effusion    EKG personally reviewed: nsr, no acute st changes Labs personally reviewed:                          9.0    12.8  )-----------( 190      ( 10 Sep 2018 16:53 )             29.3     09-10    138  |  105  |  20  ----------------------------<  88  4.7   |  18<L>  |  1.12    Ca    9.1      10 Sep 2018 16:53    TPro  8.5<H>  /  Alb  4.1  /  TBili  0.4  /  DBili  x   /  AST  39  /  ALT  20  /  AlkPhos  106  09-10        LIVER FUNCTIONS - ( 10 Sep 2018 16:53 )  Alb: 4.1 g/dL / Pro: 8.5 g/dL / ALK PHOS: 106 U/L / ALT: 20 U/L / AST: 39 U/L / GGT: x               CAPILLARY BLOOD GLUCOSE          Imaging:  CXR personally reviewed: right pleural effusion    EKG personally reviewed: nsr, no acute st changes Labs personally reviewed:                          9.0    12.8  )-----------( 190      ( 10 Sep 2018 16:53 )             29.3     09-10    138  |  105  |  20  ----------------------------<  88  4.7   |  18<L>  |  1.12    Ca    9.1      10 Sep 2018 16:53    TPro  8.5<H>  /  Alb  4.1  /  TBili  0.4  /  DBili  x   /  AST  39  /  ALT  20  /  AlkPhos  106  09-10        LIVER FUNCTIONS - ( 10 Sep 2018 16:53 )  Alb: 4.1 g/dL / Pro: 8.5 g/dL / ALK PHOS: 106 U/L / ALT: 20 U/L / AST: 39 U/L / GGT: x               CAPILLARY BLOOD GLUCOSE          Imaging:  CXR personally reviewed: right pleural effusion  cardiac cath:   40% dLM lesion (IVUS 5.0mm2), 85% pLAD lesion (IVUS <2.0mm2), 90% OM1 lesion, 80% p/m/dRCA lesion, EF:65%, EDP 18mmHg.    EKG personally reviewed: nsr, no acute st changes

## 2018-09-10 NOTE — H&P ADULT - PROBLEM SELECTOR PLAN 2
Patient with likely CHF exacerbation, though unclear previous history of CHF (not been on diuretics)  s/p lasix 20mg IV x 2 in ED  will c/w IV lasix 40mg BID  check TTE  monitor ins/outs and daily weights  obtain outpatient records

## 2018-09-10 NOTE — H&P ADULT - PROBLEM SELECTOR PLAN 6
check A1c  sliding scale  give lantus 10u now (blood sugar 88); restart lantus 14u starting tomorrow  will hold oral diabetic meds

## 2018-09-10 NOTE — CONSULT NOTE ADULT - NSHPATTENDINGPLANDISCUSS_GEN_ALL_CORE
cardiology fellow, Dr. MATEUSZ Núñez; patient seen and examined.  Hx., exam and labs as above.  I agree with the assessment and recommendations.

## 2018-09-10 NOTE — ED ADULT TRIAGE NOTE - CHIEF COMPLAINT QUOTE
shortness of breathe since June, got worse today, was admitted in [facility]. Had cardiac stent July 17 in [facility], scheduled for another stent tomorrow.

## 2018-09-10 NOTE — ED PROVIDER NOTE - NS ED ROS FT
CONST: no fevers, no chills  EYES: no pain, no vision changes  ENT: no sore throat, no ear pain, no change in hearing  CV: no chest pain, +leg swelling  RESP: +shortness of breath, no cough  ABD: no abdominal pain, no nausea, no vomiting, no diarrhea  : no dysuria, no flank pain, no hematuria  MSK: no back pain, no extremity pain  NEURO: no headache or additional neurologic complaints  HEME: no easy bleeding  SKIN:  no rash

## 2018-09-10 NOTE — CONSULT NOTE ADULT - SUBJECTIVE AND OBJECTIVE BOX
Patient seen and evaluated at bedside    Chief Complaint: SOB    HPI: Pt is a 58 year old M with a PMH of CAD w/ recent PCI (2018), HTN, PVD (b/l fem bypass), and DM that presents with worsening SOB. The patient initially presented to [facility] in May of this year and was found to have a PNA and a "mild heart attack." He was referred to cardiologist who sent him for cardiac cath at [facility]. At [facility] he states that he got one stent, and was supposed to get two more today, but he because of how he felt he came to the ER. He states that since his illness at [facility] he has had SOB and DALEY. He has also noticed that his stomach and legs were getting bigger during this time. He is not on any diuretic medications at home.       PMHx:   Diabetes  HTN (hypertension)  Stented coronary artery  Coronary artery disease  Mitral regurgitation  Obesity  PVD (peripheral vascular disease)  Diabetes  HTN (hypertension)      PSHx:   S/P femoral-popliteal bypass surgery  History of appendectomy      Allergies:  No Known Allergies      Home Meds:  amlodipine 5  ASA81  Plavix 75  gabapentin 300 TID  Insulin  Losartan 100  Metformin 850 BID  Omeprazole     Current Medications:       FAMILY HISTORY:  No pertinent family history in first degree relatives      Social History:  Smoking History:  Alcohol Use:  Drug Use:    REVIEW OF SYSTEMS:  Constitutional:     [ ] negative [ ] fevers [ ] chills [ ] weight loss [ ] weight gain  HEENT:                  [ ] negative [ ] dry eyes [ ] eye irritation [ ] postnasal drip [ ] nasal congestion  CV:                         [ ] negative  [x ] chest pain [ ] orthopnea [ ] palpitations [ ] murmur  Resp:                     [ ] negative [ ] cough [ x] shortness of breath [x ] dyspnea [ ] wheezing [ ] sputum [ ]hemoptysis  GI:                          [ ] negative [ ] nausea [ ] vomiting [ ] diarrhea [ ] constipation [x ] abd pain [ ] dysphagia   :                        [ ] negative [ ] dysuria [ ] nocturia [ ] hematuria [ ] increased urinary frequency  Musculoskeletal: [ ] negative [ ] back pain [ ] myalgias [ ] arthralgias [ ] fracture  Skin:                       [ ] negative [ ] rash [ ] itch  Neurological:        [ ] negative [ ] headache [ ] dizziness [ ] syncope [ ] weakness [ ] numbness  Psychiatric:           [ ] negative [ ] anxiety [ ] depression  Endocrine:            [ ] negative [ ] diabetes [ ] thyroid problem  Heme/Lymph:      [ ] negative [ ] anemia [ ] bleeding problem  Allergic/Immune: [ ] negative [ ] itchy eyes [ ] nasal discharge [ ] hives [ ] angioedema    [x ] All other systems negative  [ ] Unable to assess ROS because sedated with anoxic brain injury.      Physical Exam:  T(F): 98.1 (09-10), Max: 98.1 (09-10)  HR: 78 (09-10) (78 - 78)  BP: 170/73 (09-10) (120/66 - 170/73)  RR: 18 (09-10)  SpO2: 100% (09-10)  GENERAL: Mod resp distress, well-developed  HEAD:  Atraumatic, Normocephalic  ENT: EOMI, PERRLA, conjunctiva and sclera clear, Neck supple, elevated JVD, moist mucosa  CHEST/LUNG: Clear to auscultation bilaterally; w/ few rales at the bases.    BACK: No spinal tenderness  HEART: Regular rate and rhythm; No murmurs, rubs, or gallops  ABDOMEN: Soft, Nontender, Nondistended; Bowel sounds present  EXTREMITIES:  No clubbing, cyanosis, 1+ edema  PSYCH: Nl behavior, nl affect  NEUROLOGY: AAOx3, non-focal, cranial nerves intact  SKIN: Normal color, No rashes or lesions  LINES:    Cardiovascular Diagnostic Testing:    ECG: Personally reviewed:    Echo: Personally reviewed:    Stress Testing:    Cath:    Imaging:    CXR: Personally reviewed    Labs: Personally reviewed                        9.0    12.8  )-----------( 190      ( 10 Sep 2018 16:53 )             29.3     09-10    138  |  105  |  20  ----------------------------<  88  4.7   |  18<L>  |  1.12    Ca    9.1      10 Sep 2018 16:53    TPro  8.5<H>  /  Alb  4.1  /  TBili  0.4  /  DBili  x   /  AST  39  /  ALT  20  /  AlkPhos  106  09-10      Serum Pro-Brain Natriuretic Peptide: 1167 pg/mL (09-10 @ 16:53) Patient seen and evaluated at bedside    Chief Complaint: SOB    HPI: Pt is a 58 year old M with a PMH of CAD w/ recent PCI (2018), HTN, PVD (b/l fem bypass), and DM that presents with worsening SOB. The patient initially presented to [facility] in May of this year and was found to have a PNA and a "mild heart attack." He was referred to cardiologist who sent him for cardiac cath at [facility]. At [facility] he states that he got one stent, and was supposed to get two more today, but he because of how he felt he came to the ER. He states that since his illness at [facility] he has had SOB and DALEY. He has also noticed that his stomach and legs were getting bigger during this time. He is not on any diuretic medications at home.       PMHx:   Diabetes  HTN (hypertension)  Stented coronary artery  Coronary artery disease  Mitral regurgitation  Obesity  PVD (peripheral vascular disease)  Diabetes  HTN (hypertension)      PSHx:   S/P femoral-popliteal bypass surgery  History of appendectomy      Allergies:  No Known Allergies      Home Meds:  amlodipine 5  ASA81  Plavix 75  gabapentin 300 TID  Insulin  Losartan 100  Metformin 850 BID  Omeprazole       FAMILY HISTORY:  No pertinent cardiac history in first degree relatives      Social History:  Smoking History:  No  Alcohol Use:  No  Drug Use: No    REVIEW OF SYSTEMS:  Constitutional:     [ ] negative [ ] fevers [ ] chills [ ] weight loss [ ] weight gain  HEENT:                  [ ] negative [ ] dry eyes [ ] eye irritation [ ] postnasal drip [ ] nasal congestion  CV:                         [ ] negative  [x ] chest pain [ ] orthopnea [ ] palpitations [ ] murmur  Resp:                     [ ] negative [ ] cough [ x] shortness of breath [x ] dyspnea [ ] wheezing [ ] sputum [ ]hemoptysis  GI:                          [ ] negative [ ] nausea [ ] vomiting [ ] diarrhea [ ] constipation [x ] abd pain [ ] dysphagia   :                        [ ] negative [ ] dysuria [ ] nocturia [ ] hematuria [ ] increased urinary frequency  Musculoskeletal: [ ] negative [ ] back pain [ ] myalgias [ ] arthralgias [ ] fracture  Skin:                       [ ] negative [ ] rash [ ] itch  Neurological:        [ ] negative [ ] headache [ ] dizziness [ ] syncope [ ] weakness [ ] numbness  Psychiatric:           [ ] negative [ ] anxiety [ ] depression  Endocrine:            [ ] negative [ ] diabetes [ ] thyroid problem  Heme/Lymph:      [ ] negative [ ] anemia [ ] bleeding problem  Allergic/Immune: [ ] negative [ ] itchy eyes [ ] nasal discharge [ ] hives [ ] angioedema    [x ] All other systems negative        Physical Exam:  T(F): 98.1 (09-10), Max: 98.1 (09-10)  HR: 78 (09-10) (78 - 78)  BP: 170/73 (09-10) (120/66 - 170/73)  RR: 18 (09-10)  SpO2: 100% (09-10)    GENERAL: Mod resp distress, well-developed  HEAD:  Atraumatic, Normocephalic  ENT: EOMI, PERRLA, conjunctiva and sclera clear, Neck supple, elevated JVD, moist mucosa  CHEST/LUNG: Clear to auscultation bilaterally; w/ few rales at the bases.    BACK: No spinal tenderness  HEART: Regular rate and rhythm; No murmurs, rubs, or gallops  ABDOMEN: Soft, Nontender, Nondistended; Bowel sounds present  EXTREMITIES:  No clubbing, cyanosis, 1+ edema  PSYCH: Nl behavior, nl affect  NEUROLOGY: AAOx3, non-focal, cranial nerves intact  SKIN: Normal color, No rashes or lesions      ECG:  NSR, NS ST/T changes.     Labs:                       9.0    12.8  )-----------( 190      ( 10 Sep 2018 16:53 )             29.3     09-10    138  |  105  |  20  ----------------------------<  88  4.7   |  18<L>  |  1.12    Ca    9.1      10 Sep 2018 16:53    TPro  8.5<H>  /  Alb  4.1  /  TBili  0.4  /  DBili  x   /  AST  39  /  ALT  20  /  AlkPhos  106  09-10      Serum Pro-Brain Natriuretic Peptide: 1167 pg/mL (09-10 @ 16:53)

## 2018-09-11 DIAGNOSIS — R06.00 DYSPNEA, UNSPECIFIED: ICD-10-CM

## 2018-09-11 DIAGNOSIS — R14.0 ABDOMINAL DISTENSION (GASEOUS): ICD-10-CM

## 2018-09-11 DIAGNOSIS — Z79.899 OTHER LONG TERM (CURRENT) DRUG THERAPY: ICD-10-CM

## 2018-09-11 DIAGNOSIS — R18.8 OTHER ASCITES: ICD-10-CM

## 2018-09-11 DIAGNOSIS — I10 ESSENTIAL (PRIMARY) HYPERTENSION: ICD-10-CM

## 2018-09-11 DIAGNOSIS — I50.9 HEART FAILURE, UNSPECIFIED: ICD-10-CM

## 2018-09-11 DIAGNOSIS — I25.10 ATHEROSCLEROTIC HEART DISEASE OF NATIVE CORONARY ARTERY WITHOUT ANGINA PECTORIS: ICD-10-CM

## 2018-09-11 DIAGNOSIS — E11.9 TYPE 2 DIABETES MELLITUS WITHOUT COMPLICATIONS: ICD-10-CM

## 2018-09-11 DIAGNOSIS — D64.9 ANEMIA, UNSPECIFIED: ICD-10-CM

## 2018-09-11 DIAGNOSIS — Z29.9 ENCOUNTER FOR PROPHYLACTIC MEASURES, UNSPECIFIED: ICD-10-CM

## 2018-09-11 LAB
ALBUMIN SERPL ELPH-MCNC: 4.2 G/DL — SIGNIFICANT CHANGE UP (ref 3.3–5)
ALP SERPL-CCNC: 105 U/L — SIGNIFICANT CHANGE UP (ref 40–120)
ALT FLD-CCNC: 19 U/L — SIGNIFICANT CHANGE UP (ref 10–45)
ANION GAP SERPL CALC-SCNC: 13 MMOL/L — SIGNIFICANT CHANGE UP (ref 5–17)
AST SERPL-CCNC: 33 U/L — SIGNIFICANT CHANGE UP (ref 10–40)
BASOPHILS # BLD AUTO: 0.04 K/UL — SIGNIFICANT CHANGE UP (ref 0–0.2)
BASOPHILS NFR BLD AUTO: 0.3 % — SIGNIFICANT CHANGE UP (ref 0–2)
BILIRUB SERPL-MCNC: 0.4 MG/DL — SIGNIFICANT CHANGE UP (ref 0.2–1.2)
BUN SERPL-MCNC: 17 MG/DL — SIGNIFICANT CHANGE UP (ref 7–23)
CALCIUM SERPL-MCNC: 9 MG/DL — SIGNIFICANT CHANGE UP (ref 8.4–10.5)
CHLORIDE SERPL-SCNC: 103 MMOL/L — SIGNIFICANT CHANGE UP (ref 96–108)
CO2 SERPL-SCNC: 24 MMOL/L — SIGNIFICANT CHANGE UP (ref 22–31)
CREAT SERPL-MCNC: 1.15 MG/DL — SIGNIFICANT CHANGE UP (ref 0.5–1.3)
EOSINOPHIL # BLD AUTO: 0.2 K/UL — SIGNIFICANT CHANGE UP (ref 0–0.5)
EOSINOPHIL NFR BLD AUTO: 1.7 % — SIGNIFICANT CHANGE UP (ref 0–6)
FERRITIN SERPL-MCNC: 23 NG/ML — LOW (ref 30–400)
FOLATE SERPL-MCNC: >20 NG/ML — SIGNIFICANT CHANGE UP
GLUCOSE BLDC GLUCOMTR-MCNC: 120 MG/DL — HIGH (ref 70–99)
GLUCOSE BLDC GLUCOMTR-MCNC: 139 MG/DL — HIGH (ref 70–99)
GLUCOSE BLDC GLUCOMTR-MCNC: 158 MG/DL — HIGH (ref 70–99)
GLUCOSE SERPL-MCNC: 133 MG/DL — HIGH (ref 70–99)
HCT VFR BLD CALC: 30.8 % — LOW (ref 39–50)
HGB BLD-MCNC: 9.1 G/DL — LOW (ref 13–17)
IMM GRANULOCYTES NFR BLD AUTO: 0.3 % — SIGNIFICANT CHANGE UP (ref 0–1.5)
IRON SATN MFR SERPL: 25 UG/DL — LOW (ref 45–165)
IRON SATN MFR SERPL: 6 % — LOW (ref 16–55)
LYMPHOCYTES # BLD AUTO: 27.1 % — SIGNIFICANT CHANGE UP (ref 13–44)
LYMPHOCYTES # BLD AUTO: 3.23 K/UL — SIGNIFICANT CHANGE UP (ref 1–3.3)
MAGNESIUM SERPL-MCNC: 2 MG/DL — SIGNIFICANT CHANGE UP (ref 1.6–2.6)
MCHC RBC-ENTMCNC: 21 PG — LOW (ref 27–34)
MCHC RBC-ENTMCNC: 29.5 GM/DL — LOW (ref 32–36)
MCV RBC AUTO: 71 FL — LOW (ref 80–100)
MONOCYTES # BLD AUTO: 1.15 K/UL — HIGH (ref 0–0.9)
MONOCYTES NFR BLD AUTO: 9.6 % — SIGNIFICANT CHANGE UP (ref 2–14)
NEUTROPHILS # BLD AUTO: 7.27 K/UL — SIGNIFICANT CHANGE UP (ref 1.8–7.4)
NEUTROPHILS NFR BLD AUTO: 61 % — SIGNIFICANT CHANGE UP (ref 43–77)
PHOSPHATE SERPL-MCNC: 3 MG/DL — SIGNIFICANT CHANGE UP (ref 2.5–4.5)
PLATELET # BLD AUTO: 250 K/UL — SIGNIFICANT CHANGE UP (ref 150–400)
POTASSIUM SERPL-MCNC: 4 MMOL/L — SIGNIFICANT CHANGE UP (ref 3.5–5.3)
POTASSIUM SERPL-SCNC: 4 MMOL/L — SIGNIFICANT CHANGE UP (ref 3.5–5.3)
PROT SERPL-MCNC: 8.5 G/DL — HIGH (ref 6–8.3)
RBC # BLD: 4.34 M/UL — SIGNIFICANT CHANGE UP (ref 4.2–5.8)
RBC # FLD: 17.1 % — HIGH (ref 10.3–14.5)
SODIUM SERPL-SCNC: 140 MMOL/L — SIGNIFICANT CHANGE UP (ref 135–145)
TIBC SERPL-MCNC: 427 UG/DL — SIGNIFICANT CHANGE UP (ref 220–430)
UIBC SERPL-MCNC: 402 UG/DL — HIGH (ref 110–370)
VIT B12 SERPL-MCNC: 1278 PG/ML — HIGH (ref 232–1245)
WBC # BLD: 11.92 K/UL — HIGH (ref 3.8–10.5)
WBC # FLD AUTO: 11.92 K/UL — HIGH (ref 3.8–10.5)

## 2018-09-11 PROCEDURE — 99233 SBSQ HOSP IP/OBS HIGH 50: CPT

## 2018-09-11 PROCEDURE — 99233 SBSQ HOSP IP/OBS HIGH 50: CPT | Mod: GC

## 2018-09-11 PROCEDURE — 93306 TTE W/DOPPLER COMPLETE: CPT | Mod: 26

## 2018-09-11 PROCEDURE — 76705 ECHO EXAM OF ABDOMEN: CPT | Mod: 26

## 2018-09-11 RX ORDER — SENNA PLUS 8.6 MG/1
2 TABLET ORAL AT BEDTIME
Qty: 0 | Refills: 0 | Status: DISCONTINUED | OUTPATIENT
Start: 2018-09-11 | End: 2018-09-14

## 2018-09-11 RX ORDER — SODIUM CHLORIDE 9 MG/ML
1000 INJECTION, SOLUTION INTRAVENOUS
Qty: 0 | Refills: 0 | Status: DISCONTINUED | OUTPATIENT
Start: 2018-09-11 | End: 2018-09-14

## 2018-09-11 RX ORDER — INSULIN GLARGINE 100 [IU]/ML
14 INJECTION, SOLUTION SUBCUTANEOUS AT BEDTIME
Qty: 0 | Refills: 0 | Status: DISCONTINUED | OUTPATIENT
Start: 2018-09-11 | End: 2018-09-14

## 2018-09-11 RX ORDER — ROSUVASTATIN CALCIUM 5 MG/1
1 TABLET ORAL
Qty: 0 | Refills: 0 | COMMUNITY

## 2018-09-11 RX ORDER — HYDRALAZINE HCL 50 MG
50 TABLET ORAL EVERY 8 HOURS
Qty: 0 | Refills: 0 | Status: DISCONTINUED | OUTPATIENT
Start: 2018-09-11 | End: 2018-09-14

## 2018-09-11 RX ORDER — GLYBURIDE 5 MG
0 TABLET ORAL
Qty: 0 | Refills: 0 | COMMUNITY

## 2018-09-11 RX ORDER — GABAPENTIN 400 MG/1
300 CAPSULE ORAL THREE TIMES A DAY
Qty: 0 | Refills: 0 | Status: DISCONTINUED | OUTPATIENT
Start: 2018-09-11 | End: 2018-09-14

## 2018-09-11 RX ORDER — INSULIN GLARGINE 100 [IU]/ML
10 INJECTION, SOLUTION SUBCUTANEOUS ONCE
Qty: 0 | Refills: 0 | Status: COMPLETED | OUTPATIENT
Start: 2018-09-11 | End: 2018-09-11

## 2018-09-11 RX ORDER — ACETAMINOPHEN 500 MG
1 TABLET ORAL
Qty: 0 | Refills: 0 | COMMUNITY

## 2018-09-11 RX ORDER — DEXTROSE 50 % IN WATER 50 %
25 SYRINGE (ML) INTRAVENOUS ONCE
Qty: 0 | Refills: 0 | Status: DISCONTINUED | OUTPATIENT
Start: 2018-09-11 | End: 2018-09-14

## 2018-09-11 RX ORDER — ALBUTEROL 90 UG/1
2 AEROSOL, METERED ORAL EVERY 6 HOURS
Qty: 0 | Refills: 0 | Status: DISCONTINUED | OUTPATIENT
Start: 2018-09-11 | End: 2018-09-14

## 2018-09-11 RX ORDER — AMLODIPINE BESYLATE 2.5 MG/1
10 TABLET ORAL DAILY
Qty: 0 | Refills: 0 | Status: DISCONTINUED | OUTPATIENT
Start: 2018-09-11 | End: 2018-09-11

## 2018-09-11 RX ORDER — DEXTROSE 50 % IN WATER 50 %
15 SYRINGE (ML) INTRAVENOUS ONCE
Qty: 0 | Refills: 0 | Status: DISCONTINUED | OUTPATIENT
Start: 2018-09-11 | End: 2018-09-14

## 2018-09-11 RX ORDER — ENOXAPARIN SODIUM 100 MG/ML
40 INJECTION SUBCUTANEOUS EVERY 24 HOURS
Qty: 0 | Refills: 0 | Status: DISCONTINUED | OUTPATIENT
Start: 2018-09-11 | End: 2018-09-14

## 2018-09-11 RX ORDER — ATORVASTATIN CALCIUM 80 MG/1
80 TABLET, FILM COATED ORAL AT BEDTIME
Qty: 0 | Refills: 0 | Status: DISCONTINUED | OUTPATIENT
Start: 2018-09-11 | End: 2018-09-11

## 2018-09-11 RX ORDER — INSULIN LISPRO 100/ML
VIAL (ML) SUBCUTANEOUS AT BEDTIME
Qty: 0 | Refills: 0 | Status: DISCONTINUED | OUTPATIENT
Start: 2018-09-11 | End: 2018-09-14

## 2018-09-11 RX ORDER — BUDESONIDE AND FORMOTEROL FUMARATE DIHYDRATE 160; 4.5 UG/1; UG/1
2 AEROSOL RESPIRATORY (INHALATION)
Qty: 0 | Refills: 0 | Status: DISCONTINUED | OUTPATIENT
Start: 2018-09-11 | End: 2018-09-14

## 2018-09-11 RX ORDER — LOSARTAN POTASSIUM 100 MG/1
100 TABLET, FILM COATED ORAL DAILY
Qty: 0 | Refills: 0 | Status: DISCONTINUED | OUTPATIENT
Start: 2018-09-11 | End: 2018-09-12

## 2018-09-11 RX ORDER — GLUCAGON INJECTION, SOLUTION 0.5 MG/.1ML
1 INJECTION, SOLUTION SUBCUTANEOUS ONCE
Qty: 0 | Refills: 0 | Status: DISCONTINUED | OUTPATIENT
Start: 2018-09-11 | End: 2018-09-14

## 2018-09-11 RX ORDER — LOSARTAN POTASSIUM 100 MG/1
50 TABLET, FILM COATED ORAL DAILY
Qty: 0 | Refills: 0 | Status: DISCONTINUED | OUTPATIENT
Start: 2018-09-11 | End: 2018-09-11

## 2018-09-11 RX ORDER — FOLIC ACID 0.8 MG
1 TABLET ORAL DAILY
Qty: 0 | Refills: 0 | Status: DISCONTINUED | OUTPATIENT
Start: 2018-09-11 | End: 2018-09-14

## 2018-09-11 RX ORDER — CLOPIDOGREL BISULFATE 75 MG/1
75 TABLET, FILM COATED ORAL DAILY
Qty: 0 | Refills: 0 | Status: DISCONTINUED | OUTPATIENT
Start: 2018-09-11 | End: 2018-09-14

## 2018-09-11 RX ORDER — INSULIN LISPRO 100/ML
VIAL (ML) SUBCUTANEOUS
Qty: 0 | Refills: 0 | Status: DISCONTINUED | OUTPATIENT
Start: 2018-09-11 | End: 2018-09-14

## 2018-09-11 RX ORDER — POLYETHYLENE GLYCOL 3350 17 G/17G
17 POWDER, FOR SOLUTION ORAL DAILY
Qty: 0 | Refills: 0 | Status: DISCONTINUED | OUTPATIENT
Start: 2018-09-11 | End: 2018-09-14

## 2018-09-11 RX ORDER — DOCUSATE SODIUM 100 MG
100 CAPSULE ORAL THREE TIMES A DAY
Qty: 0 | Refills: 0 | Status: DISCONTINUED | OUTPATIENT
Start: 2018-09-11 | End: 2018-09-14

## 2018-09-11 RX ORDER — ASPIRIN/CALCIUM CARB/MAGNESIUM 324 MG
81 TABLET ORAL DAILY
Qty: 0 | Refills: 0 | Status: DISCONTINUED | OUTPATIENT
Start: 2018-09-11 | End: 2018-09-14

## 2018-09-11 RX ORDER — PANTOPRAZOLE SODIUM 20 MG/1
40 TABLET, DELAYED RELEASE ORAL
Qty: 0 | Refills: 0 | Status: DISCONTINUED | OUTPATIENT
Start: 2018-09-11 | End: 2018-09-14

## 2018-09-11 RX ORDER — DEXTROSE 50 % IN WATER 50 %
12.5 SYRINGE (ML) INTRAVENOUS ONCE
Qty: 0 | Refills: 0 | Status: DISCONTINUED | OUTPATIENT
Start: 2018-09-11 | End: 2018-09-14

## 2018-09-11 RX ORDER — INSULIN GLARGINE 100 [IU]/ML
14 INJECTION, SOLUTION SUBCUTANEOUS
Qty: 0 | Refills: 0 | COMMUNITY

## 2018-09-11 RX ORDER — METOPROLOL TARTRATE 50 MG
50 TABLET ORAL DAILY
Qty: 0 | Refills: 0 | Status: DISCONTINUED | OUTPATIENT
Start: 2018-09-11 | End: 2018-09-14

## 2018-09-11 RX ORDER — ATORVASTATIN CALCIUM 80 MG/1
40 TABLET, FILM COATED ORAL AT BEDTIME
Qty: 0 | Refills: 0 | Status: DISCONTINUED | OUTPATIENT
Start: 2018-09-11 | End: 2018-09-14

## 2018-09-11 RX ORDER — FUROSEMIDE 40 MG
40 TABLET ORAL EVERY 12 HOURS
Qty: 0 | Refills: 0 | Status: DISCONTINUED | OUTPATIENT
Start: 2018-09-11 | End: 2018-09-12

## 2018-09-11 RX ADMIN — Medication 1 MILLIGRAM(S): at 11:26

## 2018-09-11 RX ADMIN — ATORVASTATIN CALCIUM 40 MILLIGRAM(S): 80 TABLET, FILM COATED ORAL at 22:24

## 2018-09-11 RX ADMIN — Medication 100 MILLIGRAM(S): at 11:26

## 2018-09-11 RX ADMIN — GABAPENTIN 300 MILLIGRAM(S): 400 CAPSULE ORAL at 05:17

## 2018-09-11 RX ADMIN — INSULIN GLARGINE 14 UNIT(S): 100 INJECTION, SOLUTION SUBCUTANEOUS at 22:25

## 2018-09-11 RX ADMIN — Medication 40 MILLIGRAM(S): at 05:16

## 2018-09-11 RX ADMIN — AMLODIPINE BESYLATE 10 MILLIGRAM(S): 2.5 TABLET ORAL at 05:17

## 2018-09-11 RX ADMIN — Medication 50 MILLIGRAM(S): at 22:24

## 2018-09-11 RX ADMIN — GABAPENTIN 300 MILLIGRAM(S): 400 CAPSULE ORAL at 22:25

## 2018-09-11 RX ADMIN — GABAPENTIN 300 MILLIGRAM(S): 400 CAPSULE ORAL at 13:08

## 2018-09-11 RX ADMIN — ENOXAPARIN SODIUM 40 MILLIGRAM(S): 100 INJECTION SUBCUTANEOUS at 05:17

## 2018-09-11 RX ADMIN — Medication 40 MILLIGRAM(S): at 17:48

## 2018-09-11 RX ADMIN — Medication 81 MILLIGRAM(S): at 11:26

## 2018-09-11 RX ADMIN — CLOPIDOGREL BISULFATE 75 MILLIGRAM(S): 75 TABLET, FILM COATED ORAL at 11:26

## 2018-09-11 RX ADMIN — Medication 100 MILLIGRAM(S): at 05:17

## 2018-09-11 RX ADMIN — PANTOPRAZOLE SODIUM 40 MILLIGRAM(S): 20 TABLET, DELAYED RELEASE ORAL at 05:17

## 2018-09-11 RX ADMIN — BUDESONIDE AND FORMOTEROL FUMARATE DIHYDRATE 2 PUFF(S): 160; 4.5 AEROSOL RESPIRATORY (INHALATION) at 17:48

## 2018-09-11 RX ADMIN — Medication 50 MILLIGRAM(S): at 05:17

## 2018-09-11 RX ADMIN — Medication 50 MILLIGRAM(S): at 13:08

## 2018-09-11 RX ADMIN — Medication 1: at 13:08

## 2018-09-11 RX ADMIN — LOSARTAN POTASSIUM 100 MILLIGRAM(S): 100 TABLET, FILM COATED ORAL at 13:08

## 2018-09-11 RX ADMIN — LOSARTAN POTASSIUM 50 MILLIGRAM(S): 100 TABLET, FILM COATED ORAL at 05:17

## 2018-09-11 RX ADMIN — INSULIN GLARGINE 10 UNIT(S): 100 INJECTION, SOLUTION SUBCUTANEOUS at 01:07

## 2018-09-11 RX ADMIN — BUDESONIDE AND FORMOTEROL FUMARATE DIHYDRATE 2 PUFF(S): 160; 4.5 AEROSOL RESPIRATORY (INHALATION) at 05:18

## 2018-09-11 NOTE — PROGRESS NOTE ADULT - SUBJECTIVE AND OBJECTIVE BOX
Patient seen and examined at bedside.    Overnight Events: No acute events overnight. Pt states that he feels better after diuresis.       REVIEW OF SYSTEMS:  Constitutional:     [ ] negative [ ] fevers [ ] chills [ ] weight loss [ ] weight gain  HEENT:                  [ ] negative [ ] dry eyes [ ] eye irritation [ ] postnasal drip [ ] nasal congestion  CV:                         [ x] negative  [ ] chest pain [ ] orthopnea [ ] palpitations [ ] murmur  Resp:                     [ ] negative [ ] cough [x ] shortness of breath [ ] dyspnea [ ] wheezing [ ] sputum [ ]hemoptysis  GI:                          [ ] negative [ ] nausea [ ] vomiting [ ] diarrhea [ ] constipation [x ] abd pain [ ] dysphagia   :                        [ ] negative [ ] dysuria [ ] nocturia [ ] hematuria [ ] increased urinary frequency  Musculoskeletal: [ ] negative [ ] back pain [ ] myalgias [ ] arthralgias [ ] fracture  Skin:                       [ ] negative [ ] rash [ ] itch  Neurological:        [ ] negative [ ] headache [ ] dizziness [ ] syncope [ ] weakness [ ] numbness  Psychiatric:           [ ] negative [ ] anxiety [ ] depression  Endocrine:            [ ] negative [ ] diabetes [ ] thyroid problem  Heme/Lymph:      [ ] negative [ ] anemia [ ] bleeding problem  Allergic/Immune: [ ] negative [ ] itchy eyes [ ] nasal discharge [ ] hives [ ] angioedema    [ x] All other systems negative  [ ] Unable to assess ROS because sedated with anoxic brain injury.    Current Meds:  ALBUTerol    90 MICROgram(s) HFA Inhaler 2 Puff(s) Inhalation every 6 hours PRN  amLODIPine   Tablet 10 milliGRAM(s) Oral daily  aspirin enteric coated 81 milliGRAM(s) Oral daily  atorvastatin 40 milliGRAM(s) Oral at bedtime  buDESOnide 160 MICROgram(s)/formoterol 4.5 MICROgram(s) Inhaler 2 Puff(s) Inhalation two times a day  clopidogrel Tablet 75 milliGRAM(s) Oral daily  dextrose 40% Gel 15 Gram(s) Oral once PRN  dextrose 5%. 1000 milliLiter(s) IV Continuous <Continuous>  dextrose 50% Injectable 12.5 Gram(s) IV Push once  dextrose 50% Injectable 25 Gram(s) IV Push once  dextrose 50% Injectable 25 Gram(s) IV Push once  docusate sodium 100 milliGRAM(s) Oral three times a day  enoxaparin Injectable 40 milliGRAM(s) SubCutaneous every 24 hours  folic acid 1 milliGRAM(s) Oral daily  furosemide   Injectable 40 milliGRAM(s) IV Push every 12 hours  gabapentin 300 milliGRAM(s) Oral three times a day  glucagon  Injectable 1 milliGRAM(s) IntraMuscular once PRN  hydrALAZINE 50 milliGRAM(s) Oral every 8 hours  influenza   Vaccine 0.5 milliLiter(s) IntraMuscular once  insulin glargine Injectable (LANTUS) 14 Unit(s) SubCutaneous at bedtime  insulin lispro (HumaLOG) corrective regimen sliding scale   SubCutaneous three times a day before meals  insulin lispro (HumaLOG) corrective regimen sliding scale   SubCutaneous at bedtime  losartan 50 milliGRAM(s) Oral daily  metoprolol succinate ER 50 milliGRAM(s) Oral daily  pantoprazole    Tablet 40 milliGRAM(s) Oral before breakfast      PAST MEDICAL & SURGICAL HISTORY:  Diabetes  HTN (hypertension)  Stented coronary artery  Coronary artery disease  Mitral regurgitation  Obesity  PVD (peripheral vascular disease)  S/P femoral-popliteal bypass surgery  History of appendectomy      Vitals:  T(F): 98 (09-11), Max: 98.9 (09-10)  HR: 83 (09-11) (76 - 83)  BP: 169/92 (09-11) (120/66 - 170/73)  RR: 16 (09-11)  SpO2: 98% (09-11)  I&O's Summary      Physical Exam:  Appearance: No acute distress; well appearing  Eyes: PERRL, EOMI, pink conjunctiva  HENT: Normal oral mucosa  Cardiovascular: RRR, S1, S2, no murmurs, rubs, or gallops; 1+ edema; elevated JVD  Respiratory: Clear to auscultation bilaterally  Gastrointestinal: soft, non-tender, distended with normal bowel sounds  Musculoskeletal: No clubbing; no joint deformity   Neurologic: Non-focal  Lymphatic: No lymphadenopathy  Psychiatry: AAOx3, mood & affect appropriate  Skin: No rashes, ecchymoses, or cyanosis                          9.0    12.8  )-----------( 190      ( 10 Sep 2018 16:53 )             29.3     09-10    138  |  105  |  20  ----------------------------<  88  4.7   |  18<L>  |  1.12    Ca    9.1      10 Sep 2018 16:53    TPro  8.5<H>  /  Alb  4.1  /  TBili  0.4  /  DBili  x   /  AST  39  /  ALT  20  /  AlkPhos  106  09-10          Serum Pro-Brain Natriuretic Peptide: 1167 pg/mL (09-10 @ 16:53)          New ECG(s): Personally reviewed    Echo:    Stress Testing:     Cath:    Imaging:    Interpretation of Telemetry: Patient seen and examined at bedside.    Overnight Events: No acute events overnight. Pt states that he feels better after diuresis.       REVIEW OF SYSTEMS:  Constitutional:     [ ] negative [ ] fevers [ ] chills [ ] weight loss [ ] weight gain  HEENT:                  [ ] negative [ ] dry eyes [ ] eye irritation [ ] postnasal drip [ ] nasal congestion  CV:                         [ x] negative  [ ] chest pain [ ] orthopnea [ ] palpitations [ ] murmur  Resp:                     [ ] negative [ ] cough [x ] shortness of breath [ ] dyspnea [ ] wheezing [ ] sputum [ ]hemoptysis  GI:                          [ ] negative [ ] nausea [ ] vomiting [ ] diarrhea [ ] constipation [x ] abd pain [ ] dysphagia   :                        [ ] negative [ ] dysuria [ ] nocturia [ ] hematuria [ ] increased urinary frequency  Musculoskeletal: [ ] negative [ ] back pain [ ] myalgias [ ] arthralgias [ ] fracture  Skin:                       [ ] negative [ ] rash [ ] itch  Neurological:        [ ] negative [ ] headache [ ] dizziness [ ] syncope [ ] weakness [ ] numbness  Psychiatric:           [ ] negative [ ] anxiety [ ] depression  Endocrine:            [ ] negative [ ] diabetes [ ] thyroid problem  Heme/Lymph:      [ ] negative [ ] anemia [ ] bleeding problem  Allergic/Immune: [ ] negative [ ] itchy eyes [ ] nasal discharge [ ] hives [ ] angioedema    [ x] All other systems negative      Current Meds:  ALBUTerol    90 MICROgram(s) HFA Inhaler 2 Puff(s) Inhalation every 6 hours PRN  amLODIPine   Tablet 10 milliGRAM(s) Oral daily  aspirin enteric coated 81 milliGRAM(s) Oral daily  atorvastatin 40 milliGRAM(s) Oral at bedtime  buDESOnide 160 MICROgram(s)/formoterol 4.5 MICROgram(s) Inhaler 2 Puff(s) Inhalation two times a day  clopidogrel Tablet 75 milliGRAM(s) Oral daily  dextrose 40% Gel 15 Gram(s) Oral once PRN  dextrose 5%. 1000 milliLiter(s) IV Continuous <Continuous>  dextrose 50% Injectable 12.5 Gram(s) IV Push once  dextrose 50% Injectable 25 Gram(s) IV Push once  dextrose 50% Injectable 25 Gram(s) IV Push once  docusate sodium 100 milliGRAM(s) Oral three times a day  enoxaparin Injectable 40 milliGRAM(s) SubCutaneous every 24 hours  folic acid 1 milliGRAM(s) Oral daily  furosemide   Injectable 40 milliGRAM(s) IV Push every 12 hours  gabapentin 300 milliGRAM(s) Oral three times a day  glucagon  Injectable 1 milliGRAM(s) IntraMuscular once PRN  hydrALAZINE 50 milliGRAM(s) Oral every 8 hours  influenza   Vaccine 0.5 milliLiter(s) IntraMuscular once  insulin glargine Injectable (LANTUS) 14 Unit(s) SubCutaneous at bedtime  insulin lispro (HumaLOG) corrective regimen sliding scale   SubCutaneous three times a day before meals  insulin lispro (HumaLOG) corrective regimen sliding scale   SubCutaneous at bedtime  losartan 50 milliGRAM(s) Oral daily  metoprolol succinate ER 50 milliGRAM(s) Oral daily  pantoprazole    Tablet 40 milliGRAM(s) Oral before breakfast      PAST MEDICAL & SURGICAL HISTORY:  Diabetes  HTN (hypertension)  Stented coronary artery  Coronary artery disease  Mitral regurgitation  Obesity  PVD (peripheral vascular disease)  S/P femoral-popliteal bypass surgery  History of appendectomy      Vitals:  T(F): 98 (09-11), Max: 98.9 (09-10)  HR: 83 (09-11) (76 - 83)  BP: 169/92 (09-11) (120/66 - 170/73)  RR: 16 (09-11)  SpO2: 98% (09-11)  I&O's Summary      Physical Exam:  Appearance: No acute distress; well appearing  Eyes: PERRL, EOMI, pink conjunctiva  HENT: Normal oral mucosa  Cardiovascular: RRR, S1, S2, no murmurs, rubs, or gallops; 1+ edema; elevated JVD  Respiratory: Clear to auscultation bilaterally  Gastrointestinal: soft, non-tender, distended with normal bowel sounds  Musculoskeletal: No clubbing; no joint deformity   Neurologic: Non-focal  Lymphatic: No lymphadenopathy  Psychiatry: AAOx3, mood & affect appropriate  Skin: No rashes, ecchymoses, or cyanosis      LABS:                      9.0    12.8  )-----------( 190      ( 10 Sep 2018 16:53 )             29.3     09-10  138  |  105  |  20  ----------------------------<  88  4.7   |  18<L>  |  1.12    Ca    9.1      10 Sep 2018 16:53    TPro  8.5<H>  /  Alb  4.1  /  TBili  0.4  /  DBili  x   /  AST  39  /  ALT  20  /  AlkPhos  106  09-10    Serum Pro-Brain Natriuretic Peptide: 1167 pg/mL (09-10 @ 16:53)

## 2018-09-11 NOTE — PROGRESS NOTE ADULT - ASSESSMENT
57 yo male with PMH of ?COPD, CAD w/ recent PCI in June 2018, HTN, PVD b/l fem/pop bypass, type 2 DM presents here with decompensated HF

## 2018-09-11 NOTE — PROGRESS NOTE ADULT - ASSESSMENT
58 year old M with a PMH of CAD w/ recent PCI (2018), HTN, PVD (b/l fem bypass), and DM that presents with worsening SOB and LE edema likely 2/2 CHF. Pt improving w/ diuresis.   [place]: diagnostic cardiac catheterization which revealed 40% dLM lesion (IVUS 5.0mm2), 85% pLAD lesion (IVUS <2.0mm2), 90% OM1 lesion, 80% p/m/dRCA lesion, EF:65%, EDP 18mmHg.    #SOB likely 2/2 CHF  - Cardiac enzymes negative, likely not acute ischemic process  - CXR showing pleff, and w/ LE and SOB pt likely has CHF.  - Pt also states that he feels improved after diuretics.   - c/w IV lasix 40 BID   - Check TTE  - could check abd US to see if pt could benefit from diagnostic / therapeutic tap, given degree of discomfort.   - Monitor daily weights  - Check Is and Os  - Will plan for cath after pts resp/ fluid status is more acceptable.   - D/c amlodipine.   - c/w hydralazine, metoprolol  - Increase losartan to 100 mg    #CAD  - C/w ASA and plavix   - start atorvastatin 40 mg daily.     Hussein Núñez MD  Cardiology Fellow - PGY-4  [facility]: [id number]  [facility]: [phone] [id number] 58 year old M with a PMH of CAD w/ recent PCI (2018), HTN, PVD (b/l fem bypass), and DM that presents with worsening SOB and LE edema likely 2/2 CHF. Pt improving w/ diuresis.   [place]: diagnostic cardiac catheterization which revealed 40% dLM lesion (IVUS 5.0mm2), 85% pLAD lesion (IVUS <2.0mm2), 90% OM1 lesion, 80% p/m/dRCA lesion, EF:65%, EDP 18mmHg.    #SOB likely 2/2 CHF  - Cardiac enzymes negative, likely not acute ischemic process  - CXR showing pleural eff, and w/ LE and SOB pt likely has CHF.  - Pt also states that he feels improved after diuretics.   - c/w IV lasix 40 BID   - Check TTE  - could check abd US to see if pt could benefit from diagnostic / therapeutic tap, given degree of discomfort.   - Monitor daily weights  - Check Is and Os  - Will plan for cath after pts resp/ fluid status is more acceptable.   - D/C amlodipine.   - c/w hydralazine, metoprolol  - Increase losartan to 100 mg    #CAD  - C/w ASA and plavix   - start atorvastatin 40 mg daily.       Spoke to Dr. GLENDA De La Rosa, patient's interventional cardiologist at [facility].    Hussein Núñez MD  Cardiology Fellow - PGY-4  [facility]: [id number]  [facility]: [phone]  [id number]      Barrie Pacheco M.D.  Cardiology Consult Service  [phone] or [phone]

## 2018-09-11 NOTE — PROGRESS NOTE ADULT - SUBJECTIVE AND OBJECTIVE BOX
Benjamin Fowler MD  Pager [phone]  Spectra [phone]  Cell Phone [phone]    Patient is a 58y old  Male who presents with a chief complaint of shortness of breath and abdominal swelling (11 Sep 2018 06:07)        SUBJECTIVE / OVERNIGHT EVENTS: Patient feels well. States SOB improved. No CP      MEDICATIONS  (STANDING):  aspirin enteric coated 81 milliGRAM(s) Oral daily  atorvastatin 40 milliGRAM(s) Oral at bedtime  buDESOnide 160 MICROgram(s)/formoterol 4.5 MICROgram(s) Inhaler 2 Puff(s) Inhalation two times a day  clopidogrel Tablet 75 milliGRAM(s) Oral daily  dextrose 5%. 1000 milliLiter(s) (50 mL/Hr) IV Continuous <Continuous>  dextrose 50% Injectable 12.5 Gram(s) IV Push once  dextrose 50% Injectable 25 Gram(s) IV Push once  dextrose 50% Injectable 25 Gram(s) IV Push once  docusate sodium 100 milliGRAM(s) Oral three times a day  enoxaparin Injectable 40 milliGRAM(s) SubCutaneous every 24 hours  folic acid 1 milliGRAM(s) Oral daily  furosemide   Injectable 40 milliGRAM(s) IV Push every 12 hours  gabapentin 300 milliGRAM(s) Oral three times a day  hydrALAZINE 50 milliGRAM(s) Oral every 8 hours  influenza   Vaccine 0.5 milliLiter(s) IntraMuscular once  insulin glargine Injectable (LANTUS) 14 Unit(s) SubCutaneous at bedtime  insulin lispro (HumaLOG) corrective regimen sliding scale   SubCutaneous three times a day before meals  insulin lispro (HumaLOG) corrective regimen sliding scale   SubCutaneous at bedtime  losartan 100 milliGRAM(s) Oral daily  metoprolol succinate ER 50 milliGRAM(s) Oral daily  pantoprazole    Tablet 40 milliGRAM(s) Oral before breakfast    MEDICATIONS  (PRN):  ALBUTerol    90 MICROgram(s) HFA Inhaler 2 Puff(s) Inhalation every 6 hours PRN Shortness of Breath and/or Wheezing  dextrose 40% Gel 15 Gram(s) Oral once PRN Blood Glucose LESS THAN 70 milliGRAM(s)/deciliter  glucagon  Injectable 1 milliGRAM(s) IntraMuscular once PRN Glucose LESS THAN 70 milligrams/deciliter      Vital Signs Last 24 Hrs  T(C): 36.6 (11 Sep 2018 08:30), Max: 37.2 (10 Sep 2018 21:15)  T(F): 97.9 (11 Sep 2018 08:30), Max: 98.9 (10 Sep 2018 21:15)  HR: 77 (11 Sep 2018 08:30) (76 - 83)  BP: 128/64 (11 Sep 2018 08:30) (120/66 - 170/73)  BP(mean): --  RR: 19 (11 Sep 2018 08:30) (16 - 19)  SpO2: 99% (11 Sep 2018 08:30) (97% - 100%)  CAPILLARY BLOOD GLUCOSE      POCT Blood Glucose.: 116 mg/dL (11 Sep 2018 09:12)  POCT Blood Glucose.: 92 mg/dL (11 Sep 2018 00:41)    I&O's Summary        PHYSICAL EXAM  GENERAL: NAD, well-developed  HEAD:  Atraumatic, Normocephalic  EYES: EOMI, PERRLA, conjunctiva and sclera clear  CHEST/LUNG: Clear to auscultation bilaterally; No wheeze  HEART: Regular rate and rhythm; No murmurs, rubs, or gallops  ABDOMEN: Soft, Nontender, Nondistended; Bowel sounds present  EXTREMITIES:  2+ Peripheral Pulses, No clubbing, cyanosis, or edema  PSYCH: AAOx3      LABS:                        9.1    11.92 )-----------( 250      ( 11 Sep 2018 08:11 )             30.8     09-11    140  |  103  |  17  ----------------------------<  133<H>  4.0   |  24  |  1.15    Ca    9.0      11 Sep 2018 06:14  Phos  3.0     09-11  Mg     2.0     09-11    TPro  8.5<H>  /  Alb  4.2  /  TBili  0.4  /  DBili  x   /  AST  33  /  ALT  19  /  AlkPhos  105  09-11                RADIOLOGY & ADDITIONAL TESTS:    Consultant(s) Notes Reviewed:  Cardiology

## 2018-09-12 DIAGNOSIS — I50.23 ACUTE ON CHRONIC SYSTOLIC (CONGESTIVE) HEART FAILURE: ICD-10-CM

## 2018-09-12 LAB
ANION GAP SERPL CALC-SCNC: 14 MMOL/L — SIGNIFICANT CHANGE UP (ref 5–17)
BASOPHILS # BLD AUTO: 0.05 K/UL — SIGNIFICANT CHANGE UP (ref 0–0.2)
BASOPHILS NFR BLD AUTO: 0.5 % — SIGNIFICANT CHANGE UP (ref 0–2)
BUN SERPL-MCNC: 25 MG/DL — HIGH (ref 7–23)
CALCIUM SERPL-MCNC: 8.9 MG/DL — SIGNIFICANT CHANGE UP (ref 8.4–10.5)
CHLORIDE SERPL-SCNC: 103 MMOL/L — SIGNIFICANT CHANGE UP (ref 96–108)
CO2 SERPL-SCNC: 24 MMOL/L — SIGNIFICANT CHANGE UP (ref 22–31)
CREAT SERPL-MCNC: 1.87 MG/DL — HIGH (ref 0.5–1.3)
EOSINOPHIL # BLD AUTO: 0.21 K/UL — SIGNIFICANT CHANGE UP (ref 0–0.5)
EOSINOPHIL NFR BLD AUTO: 2.2 % — SIGNIFICANT CHANGE UP (ref 0–6)
GLUCOSE BLDC GLUCOMTR-MCNC: 140 MG/DL — HIGH (ref 70–99)
GLUCOSE BLDC GLUCOMTR-MCNC: 178 MG/DL — HIGH (ref 70–99)
GLUCOSE BLDC GLUCOMTR-MCNC: 191 MG/DL — HIGH (ref 70–99)
GLUCOSE BLDC GLUCOMTR-MCNC: 199 MG/DL — HIGH (ref 70–99)
GLUCOSE SERPL-MCNC: 127 MG/DL — HIGH (ref 70–99)
HCT VFR BLD CALC: 28.9 % — LOW (ref 39–50)
HGB BLD-MCNC: 8.5 G/DL — LOW (ref 13–17)
IMM GRANULOCYTES NFR BLD AUTO: 0.2 % — SIGNIFICANT CHANGE UP (ref 0–1.5)
LYMPHOCYTES # BLD AUTO: 3.22 K/UL — SIGNIFICANT CHANGE UP (ref 1–3.3)
LYMPHOCYTES # BLD AUTO: 33.7 % — SIGNIFICANT CHANGE UP (ref 13–44)
MAGNESIUM SERPL-MCNC: 2 MG/DL — SIGNIFICANT CHANGE UP (ref 1.6–2.6)
MCHC RBC-ENTMCNC: 20.9 PG — LOW (ref 27–34)
MCHC RBC-ENTMCNC: 29.4 GM/DL — LOW (ref 32–36)
MCV RBC AUTO: 71 FL — LOW (ref 80–100)
MONOCYTES # BLD AUTO: 1.03 K/UL — HIGH (ref 0–0.9)
MONOCYTES NFR BLD AUTO: 10.8 % — SIGNIFICANT CHANGE UP (ref 2–14)
NEUTROPHILS # BLD AUTO: 5.03 K/UL — SIGNIFICANT CHANGE UP (ref 1.8–7.4)
NEUTROPHILS NFR BLD AUTO: 52.6 % — SIGNIFICANT CHANGE UP (ref 43–77)
PLATELET # BLD AUTO: 223 K/UL — SIGNIFICANT CHANGE UP (ref 150–400)
POTASSIUM SERPL-MCNC: 3.9 MMOL/L — SIGNIFICANT CHANGE UP (ref 3.5–5.3)
POTASSIUM SERPL-SCNC: 3.9 MMOL/L — SIGNIFICANT CHANGE UP (ref 3.5–5.3)
RBC # BLD: 4.07 M/UL — LOW (ref 4.2–5.8)
RBC # FLD: 17.4 % — HIGH (ref 10.3–14.5)
SODIUM SERPL-SCNC: 140 MMOL/L — SIGNIFICANT CHANGE UP (ref 135–145)
WBC # BLD: 9.56 K/UL — SIGNIFICANT CHANGE UP (ref 3.8–10.5)
WBC # FLD AUTO: 9.56 K/UL — SIGNIFICANT CHANGE UP (ref 3.8–10.5)

## 2018-09-12 PROCEDURE — 99233 SBSQ HOSP IP/OBS HIGH 50: CPT

## 2018-09-12 PROCEDURE — 99232 SBSQ HOSP IP/OBS MODERATE 35: CPT | Mod: GC

## 2018-09-12 RX ORDER — FUROSEMIDE 40 MG
40 TABLET ORAL DAILY
Qty: 0 | Refills: 0 | Status: DISCONTINUED | OUTPATIENT
Start: 2018-09-12 | End: 2018-09-12

## 2018-09-12 RX ADMIN — BUDESONIDE AND FORMOTEROL FUMARATE DIHYDRATE 2 PUFF(S): 160; 4.5 AEROSOL RESPIRATORY (INHALATION) at 17:42

## 2018-09-12 RX ADMIN — BUDESONIDE AND FORMOTEROL FUMARATE DIHYDRATE 2 PUFF(S): 160; 4.5 AEROSOL RESPIRATORY (INHALATION) at 06:30

## 2018-09-12 RX ADMIN — GABAPENTIN 300 MILLIGRAM(S): 400 CAPSULE ORAL at 21:35

## 2018-09-12 RX ADMIN — POLYETHYLENE GLYCOL 3350 17 GRAM(S): 17 POWDER, FOR SOLUTION ORAL at 11:06

## 2018-09-12 RX ADMIN — LOSARTAN POTASSIUM 100 MILLIGRAM(S): 100 TABLET, FILM COATED ORAL at 06:30

## 2018-09-12 RX ADMIN — PANTOPRAZOLE SODIUM 40 MILLIGRAM(S): 20 TABLET, DELAYED RELEASE ORAL at 06:30

## 2018-09-12 RX ADMIN — Medication 100 MILLIGRAM(S): at 13:10

## 2018-09-12 RX ADMIN — GABAPENTIN 300 MILLIGRAM(S): 400 CAPSULE ORAL at 06:30

## 2018-09-12 RX ADMIN — Medication 40 MILLIGRAM(S): at 06:30

## 2018-09-12 RX ADMIN — Medication 50 MILLIGRAM(S): at 06:30

## 2018-09-12 RX ADMIN — Medication 50 MILLIGRAM(S): at 21:35

## 2018-09-12 RX ADMIN — Medication 50 MILLIGRAM(S): at 13:10

## 2018-09-12 RX ADMIN — SENNA PLUS 2 TABLET(S): 8.6 TABLET ORAL at 21:35

## 2018-09-12 RX ADMIN — CLOPIDOGREL BISULFATE 75 MILLIGRAM(S): 75 TABLET, FILM COATED ORAL at 11:06

## 2018-09-12 RX ADMIN — Medication 81 MILLIGRAM(S): at 11:06

## 2018-09-12 RX ADMIN — ENOXAPARIN SODIUM 40 MILLIGRAM(S): 100 INJECTION SUBCUTANEOUS at 06:30

## 2018-09-12 RX ADMIN — Medication 1: at 12:26

## 2018-09-12 RX ADMIN — ATORVASTATIN CALCIUM 40 MILLIGRAM(S): 80 TABLET, FILM COATED ORAL at 21:35

## 2018-09-12 RX ADMIN — Medication 100 MILLIGRAM(S): at 21:35

## 2018-09-12 RX ADMIN — Medication 1 MILLIGRAM(S): at 11:06

## 2018-09-12 RX ADMIN — INSULIN GLARGINE 14 UNIT(S): 100 INJECTION, SOLUTION SUBCUTANEOUS at 21:38

## 2018-09-12 RX ADMIN — GABAPENTIN 300 MILLIGRAM(S): 400 CAPSULE ORAL at 13:10

## 2018-09-12 RX ADMIN — Medication 1: at 16:56

## 2018-09-12 NOTE — PROGRESS NOTE ADULT - PROBLEM SELECTOR PLAN 2
- c/w aspirin, Plavix, statin, metoprolol, hydralazine  - hold losartan given SARKIS  - cath once compensated

## 2018-09-12 NOTE — PROGRESS NOTE ADULT - SUBJECTIVE AND OBJECTIVE BOX
Patient seen and examined at bedside.    Overnight Events: no acute events overnight. Pt take off O2      REVIEW OF SYSTEMS:  Constitutional:     [ ] negative [ ] fevers [ ] chills [ ] weight loss [ ] weight gain  HEENT:                  [ ] negative [ ] dry eyes [ ] eye irritation [ ] postnasal drip [ ] nasal congestion  CV:                         [ x] negative  [ ] chest pain [ ] orthopnea [ ] palpitations [ ] murmur  Resp:                     [ ] negative [ ] cough [ x] shortness of breath [ ] dyspnea [ ] wheezing [ ] sputum [ ]hemoptysis  GI:                          [ ] negative [ ] nausea [ ] vomiting [ ] diarrhea [ ] constipation [ ] abd pain [ ] dysphagia   :                        [ ] negative [ ] dysuria [ ] nocturia [ ] hematuria [ ] increased urinary frequency  Musculoskeletal: [ ] negative [ ] back pain [ ] myalgias [ ] arthralgias [ ] fracture  Skin:                       [ ] negative [ ] rash [ ] itch  Neurological:        [ ] negative [ ] headache [ ] dizziness [ ] syncope [ ] weakness [ ] numbness  Psychiatric:           [ ] negative [ ] anxiety [ ] depression  Endocrine:            [ ] negative [ ] diabetes [ ] thyroid problem  Heme/Lymph:      [ ] negative [ ] anemia [ ] bleeding problem  Allergic/Immune: [ ] negative [ ] itchy eyes [ ] nasal discharge [ ] hives [ ] angioedema    [ x] All other systems negative  [ ] Unable to assess ROS because sedated with anoxic brain injury.    Current Meds:  ALBUTerol    90 MICROgram(s) HFA Inhaler 2 Puff(s) Inhalation every 6 hours PRN  aspirin enteric coated 81 milliGRAM(s) Oral daily  atorvastatin 40 milliGRAM(s) Oral at bedtime  buDESOnide 160 MICROgram(s)/formoterol 4.5 MICROgram(s) Inhaler 2 Puff(s) Inhalation two times a day  clopidogrel Tablet 75 milliGRAM(s) Oral daily  dextrose 40% Gel 15 Gram(s) Oral once PRN  dextrose 5%. 1000 milliLiter(s) IV Continuous <Continuous>  dextrose 50% Injectable 12.5 Gram(s) IV Push once  dextrose 50% Injectable 25 Gram(s) IV Push once  dextrose 50% Injectable 25 Gram(s) IV Push once  docusate sodium 100 milliGRAM(s) Oral three times a day  enoxaparin Injectable 40 milliGRAM(s) SubCutaneous every 24 hours  folic acid 1 milliGRAM(s) Oral daily  furosemide   Injectable 40 milliGRAM(s) IV Push every 12 hours  gabapentin 300 milliGRAM(s) Oral three times a day  glucagon  Injectable 1 milliGRAM(s) IntraMuscular once PRN  hydrALAZINE 50 milliGRAM(s) Oral every 8 hours  influenza   Vaccine 0.5 milliLiter(s) IntraMuscular once  insulin glargine Injectable (LANTUS) 14 Unit(s) SubCutaneous at bedtime  insulin lispro (HumaLOG) corrective regimen sliding scale   SubCutaneous three times a day before meals  insulin lispro (HumaLOG) corrective regimen sliding scale   SubCutaneous at bedtime  losartan 100 milliGRAM(s) Oral daily  metoprolol succinate ER 50 milliGRAM(s) Oral daily  pantoprazole    Tablet 40 milliGRAM(s) Oral before breakfast  polyethylene glycol 3350 17 Gram(s) Oral daily  senna 2 Tablet(s) Oral at bedtime      PAST MEDICAL & SURGICAL HISTORY:  Diabetes  HTN (hypertension)  Stented coronary artery  Coronary artery disease  Mitral regurgitation  Obesity  PVD (peripheral vascular disease)  S/P femoral-popliteal bypass surgery  History of appendectomy      Vitals:  T(F): 98.4 (09-12), Max: 99.2 (09-11)  HR: 83 (09-12) (71 - 86)  BP: 136/60 (09-12) (120/76 - 161/82)  RR: 18 (09-12)  SpO2: 95% (09-12)  I&O's Summary    11 Sep 2018 07:01  -  12 Sep 2018 07:00  --------------------------------------------------------  IN: 240 mL / OUT: 375 mL / NET: -135 mL    12 Sep 2018 07:01  -  12 Sep 2018 10:39  --------------------------------------------------------  IN: 360 mL / OUT: 0 mL / NET: 360 mL        Physical Exam:  Appearance: No acute distress; well appearing  Eyes: PERRL, EOMI, pink conjunctiva  HENT: Normal oral mucosa  Cardiovascular: RRR, S1, S2, no murmurs, rubs, or gallops; 1+ edema; no JVD  Respiratory: Clear to auscultation bilaterally  Gastrointestinal: soft, non-tender, distended with normal bowel sounds  Musculoskeletal: No clubbing; no joint deformity   Neurologic: Non-focal  Lymphatic: No lymphadenopathy  Psychiatry: AAOx3, mood & affect appropriate  Skin: No rashes, ecchymoses, or cyanosis                          8.5    9.56  )-----------( 223      ( 12 Sep 2018 07:36 )             28.9     09-12    140  |  103  |  25<H>  ----------------------------<  127<H>  3.9   |  24  |  1.87<H>    Ca    8.9      12 Sep 2018 05:48  Phos  3.0     09-11  Mg     2.0     09-11    TPro  8.5<H>  /  Alb  4.2  /  TBili  0.4  /  DBili  x   /  AST  33  /  ALT  19  /  AlkPhos  105  09-11          Serum Pro-Brain Natriuretic Peptide: 1167 pg/mL (09-10 @ 16:53)          New ECG(s): Personally reviewed    Echo:  < from: TTE with Doppler (w/Cont) (09.11.18 @ 15:39) >  ------------------------------------------------------------------------  Conclusions:  1. Mild mitral regurgitation.  2. Moderate left atrial enlargement.  3. Normal left ventricular internaldimensions and wall  thicknesses.  4. Endocardial visualization enhanced with intravenous  injection of Ultrasonic Enhancing Agent (Definity). Normal  left ventricular systolic function. No segmental wall  motion abnormalities.  5. Normal right ventricular size and function.  6. Mild-moderate tricuspid regurgitation.  7. Estimated pulmonary artery systolic pressure equals 46  mm Hg, assuming right atrial pressure equals 8 mm Hg,  consistent with mild pulmonary pressures.  *** No previous Echo exam.  ------------------------------------------------------------------------  Confirmed on  9/11/2018 - 18:21:20 by Cirilo Gutierrez M.D.  ------------------------------------------------------------------------    < end of copied text >    Stress Testing:     Cath:    Imaging:    Interpretation of Telemetry: Sinus 7090 Patient seen and examined at bedside.    Overnight Events: no acute events overnight. Pt take off O2      REVIEW OF SYSTEMS:  Constitutional:     [ ] negative [ ] fevers [ ] chills [ ] weight loss [ ] weight gain  HEENT:                  [ ] negative [ ] dry eyes [ ] eye irritation [ ] postnasal drip [ ] nasal congestion  CV:                         [ x] negative  [ ] chest pain [ ] orthopnea [ ] palpitations [ ] murmur  Resp:                     [ ] negative [ ] cough [ x] shortness of breath [ ] dyspnea [ ] wheezing [ ] sputum [ ]hemoptysis  GI:                          [ ] negative [ ] nausea [ ] vomiting [ ] diarrhea [ ] constipation [ ] abd pain [ ] dysphagia   :                        [ ] negative [ ] dysuria [ ] nocturia [ ] hematuria [ ] increased urinary frequency  Musculoskeletal: [ ] negative [ ] back pain [ ] myalgias [ ] arthralgias [ ] fracture  Skin:                       [ ] negative [ ] rash [ ] itch  Neurological:        [ ] negative [ ] headache [ ] dizziness [ ] syncope [ ] weakness [ ] numbness  Psychiatric:           [ ] negative [ ] anxiety [ ] depression  Endocrine:            [ ] negative [ ] diabetes [ ] thyroid problem  Heme/Lymph:      [ ] negative [ ] anemia [ ] bleeding problem  Allergic/Immune: [ ] negative [ ] itchy eyes [ ] nasal discharge [ ] hives [ ] angioedema    [ x] All other systems negative      Current Meds:  ALBUTerol    90 MICROgram(s) HFA Inhaler 2 Puff(s) Inhalation every 6 hours PRN  aspirin enteric coated 81 milliGRAM(s) Oral daily  atorvastatin 40 milliGRAM(s) Oral at bedtime  buDESOnide 160 MICROgram(s)/formoterol 4.5 MICROgram(s) Inhaler 2 Puff(s) Inhalation two times a day  clopidogrel Tablet 75 milliGRAM(s) Oral daily  dextrose 40% Gel 15 Gram(s) Oral once PRN  dextrose 5%. 1000 milliLiter(s) IV Continuous <Continuous>  dextrose 50% Injectable 12.5 Gram(s) IV Push once  dextrose 50% Injectable 25 Gram(s) IV Push once  dextrose 50% Injectable 25 Gram(s) IV Push once  docusate sodium 100 milliGRAM(s) Oral three times a day  enoxaparin Injectable 40 milliGRAM(s) SubCutaneous every 24 hours  folic acid 1 milliGRAM(s) Oral daily  furosemide   Injectable 40 milliGRAM(s) IV Push every 12 hours  gabapentin 300 milliGRAM(s) Oral three times a day  glucagon  Injectable 1 milliGRAM(s) IntraMuscular once PRN  hydrALAZINE 50 milliGRAM(s) Oral every 8 hours  influenza   Vaccine 0.5 milliLiter(s) IntraMuscular once  insulin glargine Injectable (LANTUS) 14 Unit(s) SubCutaneous at bedtime  insulin lispro (HumaLOG) corrective regimen sliding scale   SubCutaneous three times a day before meals  insulin lispro (HumaLOG) corrective regimen sliding scale   SubCutaneous at bedtime  losartan 100 milliGRAM(s) Oral daily  metoprolol succinate ER 50 milliGRAM(s) Oral daily  pantoprazole    Tablet 40 milliGRAM(s) Oral before breakfast  polyethylene glycol 3350 17 Gram(s) Oral daily  senna 2 Tablet(s) Oral at bedtime      PAST MEDICAL & SURGICAL HISTORY:  Diabetes  HTN (hypertension)  Stented coronary artery  Coronary artery disease  Mitral regurgitation  Obesity  PVD (peripheral vascular disease)  S/P femoral-popliteal bypass surgery  History of appendectomy      Vitals:  T(F): 98.4 (09-12), Max: 99.2 (09-11)  HR: 83 (09-12) (71 - 86)  BP: 136/60 (09-12) (120/76 - 161/82)  RR: 18 (09-12)  SpO2: 95% (09-12)      Physical Exam:  Appearance: No acute distress; well appearing  Eyes: PERRL, EOMI, pink conjunctiva  HENT: Normal oral mucosa  Cardiovascular: RRR, S1, S2, no murmurs, rubs, or gallops; 1+ edema; no JVD  Respiratory: Clear to auscultation bilaterally  Gastrointestinal: soft, non-tender, distended with normal bowel sounds  Musculoskeletal: No clubbing; no joint deformity   Neurologic: Non-focal  Lymphatic: No lymphadenopathy  Psychiatry: AAOx3, mood & affect appropriate  Skin: No rashes, ecchymoses, or cyanosis      Interpretation of Telemetry: Sinus 70-90      I&O's Summary    11 Sep 2018 07:01  -  12 Sep 2018 07:00  --------------------------------------------------------  IN: 240 mL / OUT: 375 mL / NET: -135 mL    12 Sep 2018 07:01  -  12 Sep 2018 10:39  --------------------------------------------------------  IN: 360 mL / OUT: 0 mL / NET: 360 mL      LABS:                      8.5    9.56  )-----------( 223      ( 12 Sep 2018 07:36 )             28.9     09-12  140  |  103  |  25<H>  ----------------------------<  127<H>  3.9   |  24  |  1.87<H>    Ca    8.9      12 Sep 2018 05:48  Phos  3.0     09-11  Mg     2.0     09-11    TPro  8.5<H>  /  Alb  4.2  /  TBili  0.4  /  DBili  x   /  AST  33  /  ALT  19  /  AlkPhos  105  09-11      Serum Pro-Brain Natriuretic Peptide: 1167 pg/mL (09-10 @ 16:53)      Echo:  < from: TTE with Doppler (w/Cont) (09.11.18 @ 15:39) >  ------------------------------------------------------------------------  Conclusions:  1. Mild mitral regurgitation.  2. Moderate left atrial enlargement.  3. Normal left ventricular internaldimensions and wall  thicknesses.  4. Endocardial visualization enhanced with intravenous  injection of Ultrasonic Enhancing Agent (Definity). Normal  left ventricular systolic function. No segmental wall  motion abnormalities.  5. Normal right ventricular size and function.  6. Mild-moderate tricuspid regurgitation.  7. Estimated pulmonary artery systolic pressure equals 46  mm Hg, assuming right atrial pressure equals 8 mm Hg,  consistent with mild pulmonary pressures.  *** No previous Echo exam.  ------------------------------------------------------------------------  Confirmed on  9/11/2018 - 18:21:20 by Cirilo Gutierrez M.D.  ------------------------------------------------------------------------

## 2018-09-12 NOTE — PROGRESS NOTE ADULT - ASSESSMENT
58 year old M with a PMH of CAD w/ recent PCI (2018), HTN, PVD (b/l fem bypass), and DM that presents with worsening SOB and LE edema likely 2/2 CHF. Pt improving w/ diuresis.   [place]: diagnostic cardiac catheterization which revealed 40% dLM lesion (IVUS 5.0mm2), 85% pLAD lesion (IVUS <2.0mm2), 90% OM1 lesion, 80% p/m/dRCA lesion, EF:65%, EDP 18mmHg.    #SOB likely 2/2 HFpEF  - Cardiac enzymes negative, likely not acute ischemic process  - Pt also states that he feels improved after diuretics.   - change IV lasix to 40 daily. Plan to change to PO tomorrow.   - Monitor daily weights  - Check Is and Os  - c/w hydralazine, metoprolol, and losartan     #CAD  - C/w ASA and plavix   - start atorvastatin 40 mg daily.       Spoke to Dr. GLENDA De La Rosa, patient's interventional cardiologist at [facility].    Hussein Núñez MD  Cardiology Fellow - PGY-4  [place]: [id number]  NS: [phone]  [id number] 58 year old M with a PMH of CAD w/ recent PCI (2018), HTN, PVD (b/l fem bypass), and DM that presents with worsening SOB and LE edema likely 2/2 CHF. Pt improving w/ diuresis.   [place]: diagnostic cardiac catheterization which revealed 40% dLM lesion (IVUS 5.0mm2), 85% pLAD lesion (IVUS <2.0mm2), 90% OM1 lesion, 80% p/m/dRCA lesion, EF:65%, EDP 18mmHg.    #SOB likely 2/2 HFpEF  - Cardiac enzymes negative, likely not acute ischemic process  - Pt also states that he feels improved after diuretics.   - change IV Lasix to 40 daily. Plan to change to PO tomorrow.   - Please weight daily  - Continue to monitor I and Os  - c/w hydralazine, metoprolol, and losartan     #CAD  - C/w ASA and Plavix   - start atorvastatin 40 mg daily.       Hussein Núñez MD  Cardiology Fellow - PGY-4  [place]: [id number]  NS: [phone]  [id number]      Barrie Pacheco M.D.  Cardiology Consult Service  [phone] or [phone]

## 2018-09-12 NOTE — PROGRESS NOTE ADULT - NSHPATTENDINGPLANDISCUSS_GEN_ALL_CORE
Plan discussed with cardiology fellow, Dr. MATEUSZ Núñez; patient seen and examined.       I was physically present for the key portions of the evaluation and management (E/M) service provided.    I agree with the above history, physical, and plan which I have reviewed and edited where appropriate.

## 2018-09-12 NOTE — PROGRESS NOTE ADULT - ASSESSMENT
59 yo male with PMH of ?COPD, CAD w/ recent PCI in June 2018, HTN, PVD b/l fem/pop bypass, type 2 DM presents here with decompensated HF

## 2018-09-12 NOTE — PROGRESS NOTE ADULT - SUBJECTIVE AND OBJECTIVE BOX
Patient is a 58y old  Male who presents with a chief complaint of Shortness of breath and abdominal swelling (12 Sep 2018 10:39)        SUBJECTIVE / OVERNIGHT EVENTS:  Feels well. still with DALEY but improved. no chest pain.   no acute issues overnight. afebrile.     MEDICATIONS  (STANDING):  aspirin enteric coated 81 milliGRAM(s) Oral daily  atorvastatin 40 milliGRAM(s) Oral at bedtime  buDESOnide 160 MICROgram(s)/formoterol 4.5 MICROgram(s) Inhaler 2 Puff(s) Inhalation two times a day  clopidogrel Tablet 75 milliGRAM(s) Oral daily  dextrose 5%. 1000 milliLiter(s) (50 mL/Hr) IV Continuous <Continuous>  dextrose 50% Injectable 12.5 Gram(s) IV Push once  dextrose 50% Injectable 25 Gram(s) IV Push once  dextrose 50% Injectable 25 Gram(s) IV Push once  docusate sodium 100 milliGRAM(s) Oral three times a day  enoxaparin Injectable 40 milliGRAM(s) SubCutaneous every 24 hours  folic acid 1 milliGRAM(s) Oral daily  furosemide   Injectable 40 milliGRAM(s) IV Push daily  gabapentin 300 milliGRAM(s) Oral three times a day  hydrALAZINE 50 milliGRAM(s) Oral every 8 hours  influenza   Vaccine 0.5 milliLiter(s) IntraMuscular once  insulin glargine Injectable (LANTUS) 14 Unit(s) SubCutaneous at bedtime  insulin lispro (HumaLOG) corrective regimen sliding scale   SubCutaneous three times a day before meals  insulin lispro (HumaLOG) corrective regimen sliding scale   SubCutaneous at bedtime  losartan 100 milliGRAM(s) Oral daily  metoprolol succinate ER 50 milliGRAM(s) Oral daily  pantoprazole    Tablet 40 milliGRAM(s) Oral before breakfast  polyethylene glycol 3350 17 Gram(s) Oral daily  senna 2 Tablet(s) Oral at bedtime    MEDICATIONS  (PRN):  ALBUTerol    90 MICROgram(s) HFA Inhaler 2 Puff(s) Inhalation every 6 hours PRN Shortness of Breath and/or Wheezing  dextrose 40% Gel 15 Gram(s) Oral once PRN Blood Glucose LESS THAN 70 milliGRAM(s)/deciliter  glucagon  Injectable 1 milliGRAM(s) IntraMuscular once PRN Glucose LESS THAN 70 milligrams/deciliter      Vital Signs Last 24 Hrs  T(C): 36.6 (12 Sep 2018 11:40), Max: 37.3 (11 Sep 2018 20:58)  T(F): 97.8 (12 Sep 2018 11:40), Max: 99.2 (11 Sep 2018 20:58)  HR: 70 (12 Sep 2018 11:40) (70 - 86)  BP: 147/72 (12 Sep 2018 11:40) (136/60 - 161/82)  BP(mean): --  RR: 18 (12 Sep 2018 11:40) (18 - 18)  SpO2: 98% (12 Sep 2018 11:40) (94% - 98%)  CAPILLARY BLOOD GLUCOSE      POCT Blood Glucose.: 178 mg/dL (12 Sep 2018 11:39)  POCT Blood Glucose.: 140 mg/dL (12 Sep 2018 07:41)  POCT Blood Glucose.: 139 mg/dL (11 Sep 2018 21:36)  POCT Blood Glucose.: 120 mg/dL (11 Sep 2018 17:20)    I&O's Summary    11 Sep 2018 07:01  -  12 Sep 2018 07:00  --------------------------------------------------------  IN: 240 mL / OUT: 375 mL / NET: -135 mL    12 Sep 2018 07:01  -  12 Sep 2018 14:58  --------------------------------------------------------  IN: 840 mL / OUT: 625 mL / NET: 215 mL          PHYSICAL EXAM  GENERAL: NAD, well-developed  HEAD:  Atraumatic, Normocephalic  EYES: EOMI, conjunctiva and sclera clear  CHEST/LUNG: Clear to auscultation bilaterally; No wheeze  HEART: Regular rate and rhythm; No murmurs, rubs, or gallops  ABDOMEN: Soft, Nontender, Nondistended; Bowel sounds present  EXTREMITIES:  LE edema 1+  PSYCH: AAOx3  SKIN: no rash or wounds    LABS:                        8.5    9.56  )-----------( 223      ( 12 Sep 2018 07:36 )             28.9     09-12    140  |  103  |  25<H>  ----------------------------<  127<H>  3.9   |  24  |  1.87<H>    Ca    8.9      12 Sep 2018 05:48  Phos  3.0     09-11  Mg     2.0     09-12    TPro  8.5<H>  /  Alb  4.2  /  TBili  0.4  /  DBili  x   /  AST  33  /  ALT  19  /  AlkPhos  105  09-11                RADIOLOGY & ADDITIONAL TESTS:    Imaging Personally Reviewed:  Consultant(s) Notes Reviewed:    Care Discussed with Consultants/Other Providers:

## 2018-09-12 NOTE — PROGRESS NOTE ADULT - PROBLEM SELECTOR PLAN 1
Echo with EF 60 %, mild MR, TR.  Likely diastolic dysfunction.   Will hold lasix for now given SARKIS (Cr now 1.8)  Monitor renal function off diurectis. hold Losartan

## 2018-09-13 PROBLEM — I10 ESSENTIAL (PRIMARY) HYPERTENSION: Chronic | Status: ACTIVE | Noted: 2018-09-10

## 2018-09-13 PROBLEM — E11.9 TYPE 2 DIABETES MELLITUS WITHOUT COMPLICATIONS: Chronic | Status: ACTIVE | Noted: 2018-09-10

## 2018-09-13 PROBLEM — Z95.5 PRESENCE OF CORONARY ANGIOPLASTY IMPLANT AND GRAFT: Chronic | Status: ACTIVE | Noted: 2018-09-10

## 2018-09-13 LAB
ANION GAP SERPL CALC-SCNC: 14 MMOL/L — SIGNIFICANT CHANGE UP (ref 5–17)
BUN SERPL-MCNC: 30 MG/DL — HIGH (ref 7–23)
CALCIUM SERPL-MCNC: 8.8 MG/DL — SIGNIFICANT CHANGE UP (ref 8.4–10.5)
CHLORIDE SERPL-SCNC: 105 MMOL/L — SIGNIFICANT CHANGE UP (ref 96–108)
CO2 SERPL-SCNC: 23 MMOL/L — SIGNIFICANT CHANGE UP (ref 22–31)
CREAT SERPL-MCNC: 1.46 MG/DL — HIGH (ref 0.5–1.3)
GLUCOSE BLDC GLUCOMTR-MCNC: 131 MG/DL — HIGH (ref 70–99)
GLUCOSE BLDC GLUCOMTR-MCNC: 161 MG/DL — HIGH (ref 70–99)
GLUCOSE BLDC GLUCOMTR-MCNC: 184 MG/DL — HIGH (ref 70–99)
GLUCOSE BLDC GLUCOMTR-MCNC: 187 MG/DL — HIGH (ref 70–99)
GLUCOSE SERPL-MCNC: 114 MG/DL — HIGH (ref 70–99)
HCT VFR BLD CALC: 27 % — LOW (ref 39–50)
HGB BLD-MCNC: 8.3 G/DL — LOW (ref 13–17)
MAGNESIUM SERPL-MCNC: 2 MG/DL — SIGNIFICANT CHANGE UP (ref 1.6–2.6)
MCHC RBC-ENTMCNC: 21.2 PG — LOW (ref 27–34)
MCHC RBC-ENTMCNC: 30.7 GM/DL — LOW (ref 32–36)
MCV RBC AUTO: 69.1 FL — LOW (ref 80–100)
PHOSPHATE SERPL-MCNC: 4.1 MG/DL — SIGNIFICANT CHANGE UP (ref 2.5–4.5)
PLATELET # BLD AUTO: 212 K/UL — SIGNIFICANT CHANGE UP (ref 150–400)
POTASSIUM SERPL-MCNC: 3.9 MMOL/L — SIGNIFICANT CHANGE UP (ref 3.5–5.3)
POTASSIUM SERPL-SCNC: 3.9 MMOL/L — SIGNIFICANT CHANGE UP (ref 3.5–5.3)
RBC # BLD: 3.91 M/UL — LOW (ref 4.2–5.8)
RBC # FLD: 17.4 % — HIGH (ref 10.3–14.5)
SODIUM SERPL-SCNC: 142 MMOL/L — SIGNIFICANT CHANGE UP (ref 135–145)
WBC # BLD: 8.83 K/UL — SIGNIFICANT CHANGE UP (ref 3.8–10.5)
WBC # FLD AUTO: 8.83 K/UL — SIGNIFICANT CHANGE UP (ref 3.8–10.5)

## 2018-09-13 PROCEDURE — 99232 SBSQ HOSP IP/OBS MODERATE 35: CPT | Mod: GC

## 2018-09-13 PROCEDURE — 99232 SBSQ HOSP IP/OBS MODERATE 35: CPT

## 2018-09-13 RX ORDER — ISOSORBIDE MONONITRATE 60 MG/1
30 TABLET, EXTENDED RELEASE ORAL DAILY
Qty: 0 | Refills: 0 | Status: DISCONTINUED | OUTPATIENT
Start: 2018-09-13 | End: 2018-09-14

## 2018-09-13 RX ORDER — FUROSEMIDE 40 MG
40 TABLET ORAL
Qty: 0 | Refills: 0 | Status: DISCONTINUED | OUTPATIENT
Start: 2018-09-13 | End: 2018-09-14

## 2018-09-13 RX ADMIN — ENOXAPARIN SODIUM 40 MILLIGRAM(S): 100 INJECTION SUBCUTANEOUS at 05:31

## 2018-09-13 RX ADMIN — BUDESONIDE AND FORMOTEROL FUMARATE DIHYDRATE 2 PUFF(S): 160; 4.5 AEROSOL RESPIRATORY (INHALATION) at 05:31

## 2018-09-13 RX ADMIN — Medication 1: at 11:59

## 2018-09-13 RX ADMIN — Medication 100 MILLIGRAM(S): at 05:32

## 2018-09-13 RX ADMIN — Medication 40 MILLIGRAM(S): at 17:16

## 2018-09-13 RX ADMIN — Medication 0: at 22:04

## 2018-09-13 RX ADMIN — ISOSORBIDE MONONITRATE 30 MILLIGRAM(S): 60 TABLET, EXTENDED RELEASE ORAL at 13:43

## 2018-09-13 RX ADMIN — PANTOPRAZOLE SODIUM 40 MILLIGRAM(S): 20 TABLET, DELAYED RELEASE ORAL at 06:23

## 2018-09-13 RX ADMIN — Medication 100 MILLIGRAM(S): at 13:21

## 2018-09-13 RX ADMIN — Medication 50 MILLIGRAM(S): at 13:21

## 2018-09-13 RX ADMIN — CLOPIDOGREL BISULFATE 75 MILLIGRAM(S): 75 TABLET, FILM COATED ORAL at 11:07

## 2018-09-13 RX ADMIN — GABAPENTIN 300 MILLIGRAM(S): 400 CAPSULE ORAL at 13:21

## 2018-09-13 RX ADMIN — Medication 50 MILLIGRAM(S): at 05:32

## 2018-09-13 RX ADMIN — Medication 1: at 16:41

## 2018-09-13 RX ADMIN — ATORVASTATIN CALCIUM 40 MILLIGRAM(S): 80 TABLET, FILM COATED ORAL at 22:03

## 2018-09-13 RX ADMIN — Medication 81 MILLIGRAM(S): at 11:07

## 2018-09-13 RX ADMIN — BUDESONIDE AND FORMOTEROL FUMARATE DIHYDRATE 2 PUFF(S): 160; 4.5 AEROSOL RESPIRATORY (INHALATION) at 17:16

## 2018-09-13 RX ADMIN — Medication 1 MILLIGRAM(S): at 11:07

## 2018-09-13 RX ADMIN — POLYETHYLENE GLYCOL 3350 17 GRAM(S): 17 POWDER, FOR SOLUTION ORAL at 11:06

## 2018-09-13 RX ADMIN — GABAPENTIN 300 MILLIGRAM(S): 400 CAPSULE ORAL at 22:03

## 2018-09-13 RX ADMIN — Medication 50 MILLIGRAM(S): at 22:03

## 2018-09-13 RX ADMIN — Medication 100 MILLIGRAM(S): at 22:03

## 2018-09-13 RX ADMIN — INSULIN GLARGINE 14 UNIT(S): 100 INJECTION, SOLUTION SUBCUTANEOUS at 22:04

## 2018-09-13 RX ADMIN — GABAPENTIN 300 MILLIGRAM(S): 400 CAPSULE ORAL at 05:32

## 2018-09-13 RX ADMIN — SENNA PLUS 2 TABLET(S): 8.6 TABLET ORAL at 22:01

## 2018-09-13 NOTE — PROGRESS NOTE ADULT - SUBJECTIVE AND OBJECTIVE BOX
Patient is a 58y old  Male who presents with a chief complaint of Shortness of breath and abdominal swelling (13 Sep 2018 06:35)        SUBJECTIVE / OVERNIGHT EVENTS:  Still with DALEY and orthopnea. +LE edema.   afebrile no acute issues overnight.     MEDICATIONS  (STANDING):  aspirin enteric coated 81 milliGRAM(s) Oral daily  atorvastatin 40 milliGRAM(s) Oral at bedtime  buDESOnide 160 MICROgram(s)/formoterol 4.5 MICROgram(s) Inhaler 2 Puff(s) Inhalation two times a day  clopidogrel Tablet 75 milliGRAM(s) Oral daily  dextrose 5%. 1000 milliLiter(s) (50 mL/Hr) IV Continuous <Continuous>  dextrose 50% Injectable 12.5 Gram(s) IV Push once  dextrose 50% Injectable 25 Gram(s) IV Push once  dextrose 50% Injectable 25 Gram(s) IV Push once  docusate sodium 100 milliGRAM(s) Oral three times a day  enoxaparin Injectable 40 milliGRAM(s) SubCutaneous every 24 hours  folic acid 1 milliGRAM(s) Oral daily  furosemide    Tablet 40 milliGRAM(s) Oral two times a day  gabapentin 300 milliGRAM(s) Oral three times a day  hydrALAZINE 50 milliGRAM(s) Oral every 8 hours  influenza   Vaccine 0.5 milliLiter(s) IntraMuscular once  insulin glargine Injectable (LANTUS) 14 Unit(s) SubCutaneous at bedtime  insulin lispro (HumaLOG) corrective regimen sliding scale   SubCutaneous three times a day before meals  insulin lispro (HumaLOG) corrective regimen sliding scale   SubCutaneous at bedtime  isosorbide   mononitrate ER Tablet (IMDUR) 30 milliGRAM(s) Oral daily  metoprolol succinate ER 50 milliGRAM(s) Oral daily  pantoprazole    Tablet 40 milliGRAM(s) Oral before breakfast  polyethylene glycol 3350 17 Gram(s) Oral daily  senna 2 Tablet(s) Oral at bedtime    MEDICATIONS  (PRN):  ALBUTerol    90 MICROgram(s) HFA Inhaler 2 Puff(s) Inhalation every 6 hours PRN Shortness of Breath and/or Wheezing  dextrose 40% Gel 15 Gram(s) Oral once PRN Blood Glucose LESS THAN 70 milliGRAM(s)/deciliter  glucagon  Injectable 1 milliGRAM(s) IntraMuscular once PRN Glucose LESS THAN 70 milligrams/deciliter      Vital Signs Last 24 Hrs  T(C): 36.7 (13 Sep 2018 11:23), Max: 37 (13 Sep 2018 04:04)  T(F): 98.1 (13 Sep 2018 11:23), Max: 98.6 (13 Sep 2018 04:04)  HR: 69 (13 Sep 2018 13:19) (69 - 78)  BP: 138/70 (13 Sep 2018 13:19) (115/66 - 152/68)  BP(mean): --  RR: 18 (13 Sep 2018 11:23) (18 - 18)  SpO2: 99% (13 Sep 2018 11:23) (96% - 99%)  CAPILLARY BLOOD GLUCOSE      POCT Blood Glucose.: 187 mg/dL (13 Sep 2018 11:25)  POCT Blood Glucose.: 131 mg/dL (13 Sep 2018 07:42)  POCT Blood Glucose.: 199 mg/dL (12 Sep 2018 21:35)  POCT Blood Glucose.: 191 mg/dL (12 Sep 2018 16:33)    I&O's Summary    12 Sep 2018 07:01  -  13 Sep 2018 07:00  --------------------------------------------------------  IN: 1180 mL / OUT: 1475 mL / NET: -295 mL    13 Sep 2018 07:01  -  13 Sep 2018 14:48  --------------------------------------------------------  IN: 1020 mL / OUT: 750 mL / NET: 270 mL          PHYSICAL EXAM  GENERAL: NAD, well-developed  HEAD:  Atraumatic, Normocephalic  EYES: EOMI, conjunctiva and sclera clear  CHEST/LUNG: Clear to auscultation bilaterally; No wheeze  HEART: Regular rate and rhythm; No murmurs, rubs, or gallops  ABDOMEN: Soft, Nontender, Nondistended; Bowel sounds present  EXTREMITIES:  LE edema 2+  PSYCH: AAOx3  SKIN: no rash or wounds    LABS:                        8.3    8.83  )-----------( 212      ( 13 Sep 2018 08:00 )             27.0     09-13    142  |  105  |  30<H>  ----------------------------<  114<H>  3.9   |  23  |  1.46<H>    Ca    8.8      13 Sep 2018 05:19  Phos  4.1     09-13  Mg     2.0     09-13                  RADIOLOGY & ADDITIONAL TESTS:    Imaging Personally Reviewed:  Consultant(s) Notes Reviewed:    Care Discussed with Consultants/Other Providers:

## 2018-09-13 NOTE — PROGRESS NOTE ADULT - PROBLEM SELECTOR PLAN 2
- c/w aspirin, Plavix, statin, metoprolol, hydralazine  - hold losartan given SARKIS  - s/p cath and PCI at [facility], additional lesions. Per card eval no plan for additional ischemic w/u here. May return to [place] for additional intervention.

## 2018-09-13 NOTE — PROGRESS NOTE ADULT - SUBJECTIVE AND OBJECTIVE BOX
Patient seen and examined at bedside.    Overnight Events: Pt states that he is still having LE edema while standing. However, overall improved.       REVIEW OF SYSTEMS:  Constitutional:     [ ] negative [ ] fevers [ ] chills [ ] weight loss [ ] weight gain  HEENT:                  [ ] negative [ ] dry eyes [ ] eye irritation [ ] postnasal drip [ ] nasal congestion  CV:                         [x ] negative  [ ] chest pain [ ] orthopnea [ ] palpitations [ ] murmur  Resp:                     [ ] negative [ ] cough [ ] shortness of breath [x ] dyspnea [ ] wheezing [ ] sputum [ ]hemoptysis  GI:                          [ ] negative [ ] nausea [ ] vomiting [ ] diarrhea [ ] constipation [ ] abd pain [ ] dysphagia   :                        [ ] negative [ ] dysuria [ ] nocturia [ ] hematuria [ ] increased urinary frequency  Musculoskeletal: [ ] negative [ ] back pain [ ] myalgias [ ] arthralgias [ ] fracture  Skin:                       [ ] negative [ ] rash [ ] itch  Neurological:        [ ] negative [ ] headache [ ] dizziness [ ] syncope [ ] weakness [ ] numbness  Psychiatric:           [ ] negative [ ] anxiety [ ] depression  Endocrine:            [ ] negative [ ] diabetes [ ] thyroid problem  Heme/Lymph:      [ ] negative [ ] anemia [ ] bleeding problem  Allergic/Immune: [ ] negative [ ] itchy eyes [ ] nasal discharge [ ] hives [ ] angioedema    [x ] All other systems negative  [ ] Unable to assess ROS because sedated with anoxic brain injury.    Current Meds:  ALBUTerol    90 MICROgram(s) HFA Inhaler 2 Puff(s) Inhalation every 6 hours PRN  aspirin enteric coated 81 milliGRAM(s) Oral daily  atorvastatin 40 milliGRAM(s) Oral at bedtime  buDESOnide 160 MICROgram(s)/formoterol 4.5 MICROgram(s) Inhaler 2 Puff(s) Inhalation two times a day  clopidogrel Tablet 75 milliGRAM(s) Oral daily  dextrose 40% Gel 15 Gram(s) Oral once PRN  dextrose 5%. 1000 milliLiter(s) IV Continuous <Continuous>  dextrose 50% Injectable 12.5 Gram(s) IV Push once  dextrose 50% Injectable 25 Gram(s) IV Push once  dextrose 50% Injectable 25 Gram(s) IV Push once  docusate sodium 100 milliGRAM(s) Oral three times a day  enoxaparin Injectable 40 milliGRAM(s) SubCutaneous every 24 hours  folic acid 1 milliGRAM(s) Oral daily  gabapentin 300 milliGRAM(s) Oral three times a day  glucagon  Injectable 1 milliGRAM(s) IntraMuscular once PRN  hydrALAZINE 50 milliGRAM(s) Oral every 8 hours  influenza   Vaccine 0.5 milliLiter(s) IntraMuscular once  insulin glargine Injectable (LANTUS) 14 Unit(s) SubCutaneous at bedtime  insulin lispro (HumaLOG) corrective regimen sliding scale   SubCutaneous three times a day before meals  insulin lispro (HumaLOG) corrective regimen sliding scale   SubCutaneous at bedtime  metoprolol succinate ER 50 milliGRAM(s) Oral daily  pantoprazole    Tablet 40 milliGRAM(s) Oral before breakfast  polyethylene glycol 3350 17 Gram(s) Oral daily  senna 2 Tablet(s) Oral at bedtime      PAST MEDICAL & SURGICAL HISTORY:  Diabetes  HTN (hypertension)  Stented coronary artery  Coronary artery disease  Mitral regurgitation  Obesity  PVD (peripheral vascular disease)  S/P femoral-popliteal bypass surgery  History of appendectomy      Vitals:  T(F): 98.6 (09-13), Max: 98.6 (09-13)  HR: 78 (09-13) (70 - 78)  BP: 115/66 (09-13) (115/66 - 152/68)  RR: 18 (09-13)  SpO2: 97% (09-13)  I&O's Summary    11 Sep 2018 07:01  -  12 Sep 2018 07:00  --------------------------------------------------------  IN: 240 mL / OUT: 375 mL / NET: -135 mL    12 Sep 2018 07:01  -  13 Sep 2018 06:35  --------------------------------------------------------  IN: 1180 mL / OUT: 1475 mL / NET: -295 mL        Physical Exam:  Appearance: No acute distress; well appearing  Eyes: PERRL, EOMI, pink conjunctiva  HENT: Normal oral mucosa  Cardiovascular: RRR, S1, S2, no murmurs, rubs, or gallops; 1+ edema; no JVD  Respiratory: Clear to auscultation bilaterally  Gastrointestinal: soft, non-tender, non-distended with normal bowel sounds  Musculoskeletal: No clubbing; no joint deformity   Neurologic: Non-focal  Lymphatic: No lymphadenopathy  Psychiatry: AAOx3, mood & affect appropriate  Skin: No rashes, ecchymoses, or cyanosis                          8.5    9.56  )-----------( 223      ( 12 Sep 2018 07:36 )             28.9     09-13    142  |  105  |  30<H>  ----------------------------<  114<H>  3.9   |  23  |  1.46<H>    Ca    8.8      13 Sep 2018 05:19  Phos  4.1     09-13  Mg     2.0     09-13            Serum Pro-Brain Natriuretic Peptide: 1167 pg/mL (09-10 @ 16:53)          New ECG(s): Personally reviewed    Echo:  < from: TTE with Doppler (w/Cont) (09.11.18 @ 15:39) >  ------------------------------------------------------------------------  Conclusions:  1. Mild mitral regurgitation.  2. Moderate left atrial enlargement.  3. Normal left ventricular internaldimensions and wall  thicknesses.  4. Endocardial visualization enhanced with intravenous  injection of Ultrasonic Enhancing Agent (Definity). Normal  left ventricular systolic function. No segmental wall  motion abnormalities.  5. Normal right ventricular size and function.  6. Mild-moderate tricuspid regurgitation.  7. Estimated pulmonary artery systolic pressure equals 46  mm Hg, assuming right atrial pressure equals 8 mm Hg,  consistent with mild pulmonary pressures.  *** No previous Echo exam.  ------------------------------------------------------------------------  Confirmed on  9/11/2018 - 18:21:20 by Cirilo Gutierrez M.D.  ------------------------------------------------------------------------    < end of copied text >    Stress Testing:     Cath:    Imaging:    Interpretation of Telemetry: Sinus 60-90 Patient seen and examined at bedside.    Overnight Events: Pt states that he is still having LE edema while standing. However, overall improved.       REVIEW OF SYSTEMS:  Constitutional:     [ ] negative [ ] fevers [ ] chills [ ] weight loss [ ] weight gain  HEENT:                  [ ] negative [ ] dry eyes [ ] eye irritation [ ] postnasal drip [ ] nasal congestion  CV:                         [x ] negative  [ ] chest pain [ ] orthopnea [ ] palpitations [ ] murmur  Resp:                     [ ] negative [ ] cough [ ] shortness of breath [x ] dyspnea [ ] wheezing [ ] sputum [ ]hemoptysis  GI:                          [ ] negative [ ] nausea [ ] vomiting [ ] diarrhea [ ] constipation [ ] abd pain [ ] dysphagia   :                        [ ] negative [ ] dysuria [ ] nocturia [ ] hematuria [ ] increased urinary frequency  Musculoskeletal: [ ] negative [ ] back pain [ ] myalgias [ ] arthralgias [ ] fracture  Skin:                       [ ] negative [ ] rash [ ] itch  Neurological:        [ ] negative [ ] headache [ ] dizziness [ ] syncope [ ] weakness [ ] numbness  Psychiatric:           [ ] negative [ ] anxiety [ ] depression  Endocrine:            [ ] negative [ ] diabetes [ ] thyroid problem  Heme/Lymph:      [ ] negative [ ] anemia [ ] bleeding problem  Allergic/Immune: [ ] negative [ ] itchy eyes [ ] nasal discharge [ ] hives [ ] angioedema    [x ] All other systems negative      Current Meds:  ALBUTerol    90 MICROgram(s) HFA Inhaler 2 Puff(s) Inhalation every 6 hours PRN  aspirin enteric coated 81 milliGRAM(s) Oral daily  atorvastatin 40 milliGRAM(s) Oral at bedtime  buDESOnide 160 MICROgram(s)/formoterol 4.5 MICROgram(s) Inhaler 2 Puff(s) Inhalation two times a day  clopidogrel Tablet 75 milliGRAM(s) Oral daily  dextrose 40% Gel 15 Gram(s) Oral once PRN  dextrose 5%. 1000 milliLiter(s) IV Continuous <Continuous>  dextrose 50% Injectable 12.5 Gram(s) IV Push once  dextrose 50% Injectable 25 Gram(s) IV Push once  dextrose 50% Injectable 25 Gram(s) IV Push once  docusate sodium 100 milliGRAM(s) Oral three times a day  enoxaparin Injectable 40 milliGRAM(s) SubCutaneous every 24 hours  folic acid 1 milliGRAM(s) Oral daily  gabapentin 300 milliGRAM(s) Oral three times a day  glucagon  Injectable 1 milliGRAM(s) IntraMuscular once PRN  hydrALAZINE 50 milliGRAM(s) Oral every 8 hours  influenza   Vaccine 0.5 milliLiter(s) IntraMuscular once  insulin glargine Injectable (LANTUS) 14 Unit(s) SubCutaneous at bedtime  insulin lispro (HumaLOG) corrective regimen sliding scale   SubCutaneous three times a day before meals  insulin lispro (HumaLOG) corrective regimen sliding scale   SubCutaneous at bedtime  metoprolol succinate ER 50 milliGRAM(s) Oral daily  pantoprazole    Tablet 40 milliGRAM(s) Oral before breakfast  polyethylene glycol 3350 17 Gram(s) Oral daily  senna 2 Tablet(s) Oral at bedtime      PAST MEDICAL & SURGICAL HISTORY:  Diabetes  HTN (hypertension)  Stented coronary artery  Coronary artery disease  Mitral regurgitation  Obesity  PVD (peripheral vascular disease)  S/P femoral-popliteal bypass surgery  History of appendectomy      Vitals:  T(F): 98.6 (09-13), Max: 98.6 (09-13)  HR: 78 (09-13) (70 - 78)  BP: 115/66 (09-13) (115/66 - 152/68)  RR: 18 (09-13)  SpO2: 97% (09-13)      Physical Exam:  Appearance: No acute distress; well appearing  Eyes: PERRL, EOMI, pink conjunctiva  HENT: Normal oral mucosa  Cardiovascular: RRR, S1, S2, no murmurs, rubs, or gallops; 1+ edema; no JVD  Respiratory: Clear to auscultation bilaterally  Gastrointestinal: soft, non-tender, non-distended with normal bowel sounds  Musculoskeletal: No clubbing; no joint deformity   Neurologic: Non-focal  Lymphatic: No lymphadenopathy  Psychiatry: AAOx3, mood & affect appropriate  Skin: No rashes, ecchymoses, or cyanosis      I&O's Summary    11 Sep 2018 07:01  -  12 Sep 2018 07:00  --------------------------------------------------------  IN: 240 mL / OUT: 375 mL / NET: -135 mL    12 Sep 2018 07:01  -  13 Sep 2018 06:35  --------------------------------------------------------  IN: 1180 mL / OUT: 1475 mL / NET: -295 mL      LABS:                      8.5    9.56  )-----------( 223      ( 12 Sep 2018 07:36 )             28.9     09-13  142  |  105  |  30<H>  ----------------------------<  114<H>  3.9   |  23  |  1.46<H>    Ca    8.8      13 Sep 2018 05:19  Phos  4.1     09-13  Mg     2.0     09-13      Serum Pro-Brain Natriuretic Peptide: 1167 pg/mL (09-10 @ 16:53)      TTE with Doppler (w/Cont) (09.11.18 @ 15:39) >  ------------------------------------------------------------------------  Conclusions:  1. Mild mitral regurgitation.  2. Moderate left atrial enlargement.  3. Normal left ventricular internal dimensions and wall thicknesses.  4. Endocardial visualization enhanced with intravenous injection of Ultrasonic Enhancing Agent (Definity). Normal  left ventricular systolic function. No segmental wall motion abnormalities.   5. Normal right ventricular size and function.  6. Mild-moderate tricuspid regurgitation.  7. Estimated pulmonary artery systolic pressure equals 46 mm Hg, assuming right atrial pressure equals 8 mm Hg, consistent with mild pulmonary pressures.   *** No previous Echo exam.  ------------------------------------------------------------------------  Confirmed on  9/11/2018 - 18:21:20 by Cirilo Gutierrez M.D.  ------------------------------------------------------------------------        Interpretation of Telemetry: Sinus [id number]

## 2018-09-13 NOTE — PROGRESS NOTE ADULT - PROBLEM SELECTOR PLAN 1
Will resume lasix 40mg oral BID now improved renal function.   Hold ARB for now.   Echo with EF 60 %, mild MR, TR.  Likely diastolic dysfunction.  CArd following.

## 2018-09-14 ENCOUNTER — TRANSCRIPTION ENCOUNTER (OUTPATIENT)
Age: 58
End: 2018-09-14

## 2018-09-14 VITALS — HEART RATE: 75 BPM | SYSTOLIC BLOOD PRESSURE: 164 MMHG | DIASTOLIC BLOOD PRESSURE: 76 MMHG

## 2018-09-14 LAB
GLUCOSE BLDC GLUCOMTR-MCNC: 139 MG/DL — HIGH (ref 70–99)
GLUCOSE BLDC GLUCOMTR-MCNC: 174 MG/DL — HIGH (ref 70–99)

## 2018-09-14 PROCEDURE — 76705 ECHO EXAM OF ABDOMEN: CPT

## 2018-09-14 PROCEDURE — 82962 GLUCOSE BLOOD TEST: CPT

## 2018-09-14 PROCEDURE — 85014 HEMATOCRIT: CPT

## 2018-09-14 PROCEDURE — 96374 THER/PROPH/DIAG INJ IV PUSH: CPT

## 2018-09-14 PROCEDURE — 94640 AIRWAY INHALATION TREATMENT: CPT

## 2018-09-14 PROCEDURE — 80048 BASIC METABOLIC PNL TOTAL CA: CPT

## 2018-09-14 PROCEDURE — 84295 ASSAY OF SERUM SODIUM: CPT

## 2018-09-14 PROCEDURE — 83605 ASSAY OF LACTIC ACID: CPT

## 2018-09-14 PROCEDURE — 82435 ASSAY OF BLOOD CHLORIDE: CPT

## 2018-09-14 PROCEDURE — 96376 TX/PRO/DX INJ SAME DRUG ADON: CPT

## 2018-09-14 PROCEDURE — C8929: CPT

## 2018-09-14 PROCEDURE — 83880 ASSAY OF NATRIURETIC PEPTIDE: CPT

## 2018-09-14 PROCEDURE — 82803 BLOOD GASES ANY COMBINATION: CPT

## 2018-09-14 PROCEDURE — 85027 COMPLETE CBC AUTOMATED: CPT

## 2018-09-14 PROCEDURE — 99232 SBSQ HOSP IP/OBS MODERATE 35: CPT | Mod: GC

## 2018-09-14 PROCEDURE — 90686 IIV4 VACC NO PRSV 0.5 ML IM: CPT

## 2018-09-14 PROCEDURE — 71046 X-RAY EXAM CHEST 2 VIEWS: CPT

## 2018-09-14 PROCEDURE — 82728 ASSAY OF FERRITIN: CPT

## 2018-09-14 PROCEDURE — 84100 ASSAY OF PHOSPHORUS: CPT

## 2018-09-14 PROCEDURE — 99285 EMERGENCY DEPT VISIT HI MDM: CPT | Mod: 25

## 2018-09-14 PROCEDURE — 99239 HOSP IP/OBS DSCHRG MGMT >30: CPT

## 2018-09-14 PROCEDURE — 82330 ASSAY OF CALCIUM: CPT

## 2018-09-14 PROCEDURE — 83735 ASSAY OF MAGNESIUM: CPT

## 2018-09-14 PROCEDURE — 82607 VITAMIN B-12: CPT

## 2018-09-14 PROCEDURE — 82947 ASSAY GLUCOSE BLOOD QUANT: CPT

## 2018-09-14 PROCEDURE — 82746 ASSAY OF FOLIC ACID SERUM: CPT

## 2018-09-14 PROCEDURE — 80053 COMPREHEN METABOLIC PANEL: CPT

## 2018-09-14 PROCEDURE — 84484 ASSAY OF TROPONIN QUANT: CPT

## 2018-09-14 PROCEDURE — 84132 ASSAY OF SERUM POTASSIUM: CPT

## 2018-09-14 PROCEDURE — 83550 IRON BINDING TEST: CPT

## 2018-09-14 RX ORDER — POLYETHYLENE GLYCOL 3350 17 G/17G
17 POWDER, FOR SOLUTION ORAL
Qty: 30 | Refills: 0 | OUTPATIENT
Start: 2018-09-14 | End: 2018-10-13

## 2018-09-14 RX ORDER — ASPIRIN/CALCIUM CARB/MAGNESIUM 324 MG
1 TABLET ORAL
Qty: 0 | Refills: 0 | COMMUNITY
Start: 2018-09-14

## 2018-09-14 RX ORDER — METOPROLOL TARTRATE 50 MG
75 TABLET ORAL DAILY
Qty: 0 | Refills: 0 | Status: DISCONTINUED | OUTPATIENT
Start: 2018-09-14 | End: 2018-09-14

## 2018-09-14 RX ORDER — ATORVASTATIN CALCIUM 80 MG/1
1 TABLET, FILM COATED ORAL
Qty: 30 | Refills: 0 | OUTPATIENT
Start: 2018-09-14 | End: 2018-10-13

## 2018-09-14 RX ORDER — DOCUSATE SODIUM 100 MG
1 CAPSULE ORAL
Qty: 90 | Refills: 0 | OUTPATIENT
Start: 2018-09-14 | End: 2018-10-13

## 2018-09-14 RX ORDER — LOSARTAN POTASSIUM 100 MG/1
1 TABLET, FILM COATED ORAL
Qty: 0 | Refills: 0 | COMMUNITY

## 2018-09-14 RX ORDER — GABAPENTIN 400 MG/1
1 CAPSULE ORAL
Qty: 0 | Refills: 0 | COMMUNITY

## 2018-09-14 RX ORDER — DOCUSATE SODIUM 100 MG
1 CAPSULE ORAL
Qty: 0 | Refills: 0 | COMMUNITY

## 2018-09-14 RX ORDER — ISOSORBIDE MONONITRATE 60 MG/1
60 TABLET, EXTENDED RELEASE ORAL DAILY
Qty: 0 | Refills: 0 | Status: DISCONTINUED | OUTPATIENT
Start: 2018-09-14 | End: 2018-09-14

## 2018-09-14 RX ORDER — ALBUTEROL 90 UG/1
2 AEROSOL, METERED ORAL
Qty: 0 | Refills: 0 | COMMUNITY

## 2018-09-14 RX ORDER — SENNA PLUS 8.6 MG/1
2 TABLET ORAL
Qty: 60 | Refills: 0 | OUTPATIENT
Start: 2018-09-14 | End: 2018-10-13

## 2018-09-14 RX ORDER — METOPROLOL TARTRATE 50 MG
1 TABLET ORAL
Qty: 0 | Refills: 0 | COMMUNITY

## 2018-09-14 RX ORDER — GABAPENTIN 400 MG/1
1 CAPSULE ORAL
Qty: 0 | Refills: 0 | COMMUNITY
Start: 2018-09-14

## 2018-09-14 RX ORDER — ISOSORBIDE MONONITRATE 60 MG/1
1 TABLET, EXTENDED RELEASE ORAL
Qty: 30 | Refills: 0 | OUTPATIENT
Start: 2018-09-14 | End: 2018-10-13

## 2018-09-14 RX ORDER — FUROSEMIDE 40 MG
1 TABLET ORAL
Qty: 60 | Refills: 0 | OUTPATIENT
Start: 2018-09-14 | End: 2018-10-13

## 2018-09-14 RX ORDER — ACETAMINOPHEN 500 MG
1 TABLET ORAL
Qty: 0 | Refills: 0 | COMMUNITY

## 2018-09-14 RX ORDER — SIMVASTATIN 20 MG/1
1 TABLET, FILM COATED ORAL
Qty: 0 | Refills: 0 | COMMUNITY

## 2018-09-14 RX ORDER — CLOPIDOGREL BISULFATE 75 MG/1
1 TABLET, FILM COATED ORAL
Qty: 0 | Refills: 0 | COMMUNITY
Start: 2018-09-14

## 2018-09-14 RX ORDER — METFORMIN HYDROCHLORIDE 850 MG/1
1 TABLET ORAL
Qty: 0 | Refills: 0 | COMMUNITY

## 2018-09-14 RX ORDER — METOPROLOL TARTRATE 50 MG
3 TABLET ORAL
Qty: 90 | Refills: 0 | OUTPATIENT
Start: 2018-09-14 | End: 2018-10-13

## 2018-09-14 RX ORDER — ALBUTEROL 90 UG/1
2 AEROSOL, METERED ORAL
Qty: 2 | Refills: 0 | OUTPATIENT
Start: 2018-09-14 | End: 2018-10-13

## 2018-09-14 RX ADMIN — Medication 40 MILLIGRAM(S): at 05:10

## 2018-09-14 RX ADMIN — Medication 50 MILLIGRAM(S): at 05:10

## 2018-09-14 RX ADMIN — Medication 81 MILLIGRAM(S): at 11:18

## 2018-09-14 RX ADMIN — Medication 50 MILLIGRAM(S): at 05:11

## 2018-09-14 RX ADMIN — ISOSORBIDE MONONITRATE 30 MILLIGRAM(S): 60 TABLET, EXTENDED RELEASE ORAL at 11:18

## 2018-09-14 RX ADMIN — Medication 100 MILLIGRAM(S): at 13:11

## 2018-09-14 RX ADMIN — BUDESONIDE AND FORMOTEROL FUMARATE DIHYDRATE 2 PUFF(S): 160; 4.5 AEROSOL RESPIRATORY (INHALATION) at 05:11

## 2018-09-14 RX ADMIN — CLOPIDOGREL BISULFATE 75 MILLIGRAM(S): 75 TABLET, FILM COATED ORAL at 11:18

## 2018-09-14 RX ADMIN — GABAPENTIN 300 MILLIGRAM(S): 400 CAPSULE ORAL at 13:12

## 2018-09-14 RX ADMIN — ENOXAPARIN SODIUM 40 MILLIGRAM(S): 100 INJECTION SUBCUTANEOUS at 05:11

## 2018-09-14 RX ADMIN — INFLUENZA VIRUS VACCINE 0.5 MILLILITER(S): 15; 15; 15; 15 SUSPENSION INTRAMUSCULAR at 13:58

## 2018-09-14 RX ADMIN — Medication 50 MILLIGRAM(S): at 13:11

## 2018-09-14 RX ADMIN — PANTOPRAZOLE SODIUM 40 MILLIGRAM(S): 20 TABLET, DELAYED RELEASE ORAL at 06:55

## 2018-09-14 RX ADMIN — Medication 100 MILLIGRAM(S): at 05:11

## 2018-09-14 RX ADMIN — POLYETHYLENE GLYCOL 3350 17 GRAM(S): 17 POWDER, FOR SOLUTION ORAL at 11:22

## 2018-09-14 RX ADMIN — Medication 1 MILLIGRAM(S): at 11:18

## 2018-09-14 RX ADMIN — GABAPENTIN 300 MILLIGRAM(S): 400 CAPSULE ORAL at 05:11

## 2018-09-14 RX ADMIN — Medication 1: at 12:00

## 2018-09-14 NOTE — PROGRESS NOTE ADULT - SUBJECTIVE AND OBJECTIVE BOX
Patient seen and examined at bedside.    Overnight Events: No acute events overnight. Pt states that he does have a sore throat.       REVIEW OF SYSTEMS:  Constitutional:     [ ] negative [ ] fevers [ ] chills [ ] weight loss [ ] weight gain  HEENT:                  [ ] negative [ ] dry eyes [ ] eye irritation [ ] postnasal drip [ ] nasal congestion  CV:                         [ x] negative  [ ] chest pain [ ] orthopnea [ ] palpitations [ ] murmur  Resp:                     [ ] negative [ ] cough [ ] shortness of breath [ ] dyspnea [ ] wheezing [ ] sputum [ ]hemoptysis  GI:                          [ ] negative [ ] nausea [ ] vomiting [ ] diarrhea [ ] constipation [ ] abd pain [ ] dysphagia   :                        [ ] negative [ ] dysuria [ ] nocturia [ ] hematuria [ ] increased urinary frequency  Musculoskeletal: [ ] negative [ ] back pain [ ] myalgias [ ] arthralgias [ ] fracture  Skin:                       [ ] negative [ ] rash [ ] itch  Neurological:        [ ] negative [ ] headache [ ] dizziness [ ] syncope [ ] weakness [ ] numbness  Psychiatric:           [ ] negative [ ] anxiety [ ] depression  Endocrine:            [ ] negative [ ] diabetes [ ] thyroid problem  Heme/Lymph:      [ ] negative [ ] anemia [ ] bleeding problem  Allergic/Immune: [ ] negative [ ] itchy eyes [ ] nasal discharge [ ] hives [ ] angioedema    [x ] All other systems negative  [ ] Unable to assess ROS because sedated with anoxic brain injury.    Current Meds:  ALBUTerol    90 MICROgram(s) HFA Inhaler 2 Puff(s) Inhalation every 6 hours PRN  aspirin enteric coated 81 milliGRAM(s) Oral daily  atorvastatin 40 milliGRAM(s) Oral at bedtime  buDESOnide 160 MICROgram(s)/formoterol 4.5 MICROgram(s) Inhaler 2 Puff(s) Inhalation two times a day  clopidogrel Tablet 75 milliGRAM(s) Oral daily  dextrose 40% Gel 15 Gram(s) Oral once PRN  dextrose 5%. 1000 milliLiter(s) IV Continuous <Continuous>  dextrose 50% Injectable 12.5 Gram(s) IV Push once  dextrose 50% Injectable 25 Gram(s) IV Push once  dextrose 50% Injectable 25 Gram(s) IV Push once  docusate sodium 100 milliGRAM(s) Oral three times a day  enoxaparin Injectable 40 milliGRAM(s) SubCutaneous every 24 hours  folic acid 1 milliGRAM(s) Oral daily  furosemide    Tablet 40 milliGRAM(s) Oral two times a day  gabapentin 300 milliGRAM(s) Oral three times a day  glucagon  Injectable 1 milliGRAM(s) IntraMuscular once PRN  hydrALAZINE 50 milliGRAM(s) Oral every 8 hours  influenza   Vaccine 0.5 milliLiter(s) IntraMuscular once  insulin glargine Injectable (LANTUS) 14 Unit(s) SubCutaneous at bedtime  insulin lispro (HumaLOG) corrective regimen sliding scale   SubCutaneous three times a day before meals  insulin lispro (HumaLOG) corrective regimen sliding scale   SubCutaneous at bedtime  isosorbide   mononitrate ER Tablet (IMDUR) 30 milliGRAM(s) Oral daily  metoprolol succinate ER 50 milliGRAM(s) Oral daily  pantoprazole    Tablet 40 milliGRAM(s) Oral before breakfast  polyethylene glycol 3350 17 Gram(s) Oral daily  senna 2 Tablet(s) Oral at bedtime      PAST MEDICAL & SURGICAL HISTORY:  Diabetes  HTN (hypertension)  Stented coronary artery  Coronary artery disease  Mitral regurgitation  Obesity  PVD (peripheral vascular disease)  S/P femoral-popliteal bypass surgery  History of appendectomy      Vitals:  T(F): 98.3 (09-14), Max: 98.8 (09-13)  HR: 77 (09-14) (69 - 80)  BP: 128/72 (09-14) (128/72 - 157/72)  RR: 17 (09-14)  SpO2: 94% (09-14)  I&O's Summary    13 Sep 2018 07:01  -  14 Sep 2018 07:00  --------------------------------------------------------  IN: 1380 mL / OUT: 3150 mL / NET: -1770 mL        Physical Exam:  Appearance: No acute distress; well appearing  Eyes: PERRL, EOMI, pink conjunctiva  HENT: Normal oral mucosa  Cardiovascular: RRR, S1, S2, no murmurs, rubs, or gallops; trace edema; no JVD  Respiratory: Clear to auscultation bilaterally  Gastrointestinal: soft, non-tender, non-distended with normal bowel sounds  Musculoskeletal: No clubbing; no joint deformity   Neurologic: Non-focal  Lymphatic: No lymphadenopathy  Psychiatry: AAOx3, mood & affect appropriate  Skin: No rashes, ecchymoses, or cyanosis                          8.3    8.83  )-----------( 212      ( 13 Sep 2018 08:00 )             27.0     09-13    142  |  105  |  30<H>  ----------------------------<  114<H>  3.9   |  23  |  1.46<H>    Ca    8.8      13 Sep 2018 05:19  Phos  4.1     09-13  Mg     2.0     09-13            Serum Pro-Brain Natriuretic Peptide: 1167 pg/mL (09-10 @ 16:53)          New ECG(s): Personally reviewed    Echo:  < from: TTE with Doppler (w/Cont) (09.11.18 @ 15:39) >  ------------------------------------------------------------------------  Conclusions:  1. Mild mitral regurgitation.  2. Moderate left atrial enlargement.  3. Normal left ventricular internaldimensions and wall  thicknesses.  4. Endocardial visualization enhanced with intravenous  injection of Ultrasonic Enhancing Agent (Definity). Normal  left ventricular systolic function. No segmental wall  motion abnormalities.  5. Normal right ventricular size and function.  6. Mild-moderate tricuspid regurgitation.  7. Estimated pulmonary artery systolic pressure equals 46  mm Hg, assuming right atrial pressure equals 8 mm Hg,  consistent with mild pulmonary pressures.  *** No previous Echo exam.  ------------------------------------------------------------------------  Confirmed on  9/11/2018 - 18:21:20 by Cirilo Gutierrez M.D.  ------------------------------------------------------------------------    < end of copied text >    Stress Testing:     Cath:    Imaging:    Interpretation of Telemetry: sinus 60s to 80s Patient seen and examined at bedside.    Overnight Events: No acute events overnight. Pt states that he does have a sore throat.       REVIEW OF SYSTEMS:  Constitutional:     [ ] negative [ ] fevers [ ] chills [ ] weight loss [ ] weight gain  HEENT:                  [ ] negative [ ] dry eyes [ ] eye irritation [ ] postnasal drip [ ] nasal congestion  CV:                         [ x] negative  [ ] chest pain [ ] orthopnea [ ] palpitations [ ] murmur  Resp:                     [ ] negative [ ] cough [ ] shortness of breath [ ] dyspnea [ ] wheezing [ ] sputum [ ]hemoptysis  GI:                          [ ] negative [ ] nausea [ ] vomiting [ ] diarrhea [ ] constipation [ ] abd pain [ ] dysphagia   :                        [ ] negative [ ] dysuria [ ] nocturia [ ] hematuria [ ] increased urinary frequency  Musculoskeletal: [ ] negative [ ] back pain [ ] myalgias [ ] arthralgias [ ] fracture  Skin:                       [ ] negative [ ] rash [ ] itch  Neurological:        [ ] negative [ ] headache [ ] dizziness [ ] syncope [ ] weakness [ ] numbness  Psychiatric:           [ ] negative [ ] anxiety [ ] depression  Endocrine:            [ ] negative [ ] diabetes [ ] thyroid problem  Heme/Lymph:      [ ] negative [ ] anemia [ ] bleeding problem  Allergic/Immune: [ ] negative [ ] itchy eyes [ ] nasal discharge [ ] hives [ ] angioedema    [x ] All other systems negative      Current Meds:  ALBUTerol    90 MICROgram(s) HFA Inhaler 2 Puff(s) Inhalation every 6 hours PRN  aspirin enteric coated 81 milliGRAM(s) Oral daily  atorvastatin 40 milliGRAM(s) Oral at bedtime  buDESOnide 160 MICROgram(s)/formoterol 4.5 MICROgram(s) Inhaler 2 Puff(s) Inhalation two times a day  clopidogrel Tablet 75 milliGRAM(s) Oral daily  dextrose 40% Gel 15 Gram(s) Oral once PRN  dextrose 5%. 1000 milliLiter(s) IV Continuous <Continuous>  dextrose 50% Injectable 12.5 Gram(s) IV Push once  dextrose 50% Injectable 25 Gram(s) IV Push once  dextrose 50% Injectable 25 Gram(s) IV Push once  docusate sodium 100 milliGRAM(s) Oral three times a day  enoxaparin Injectable 40 milliGRAM(s) SubCutaneous every 24 hours  folic acid 1 milliGRAM(s) Oral daily  furosemide    Tablet 40 milliGRAM(s) Oral two times a day  gabapentin 300 milliGRAM(s) Oral three times a day  glucagon  Injectable 1 milliGRAM(s) IntraMuscular once PRN  hydrALAZINE 50 milliGRAM(s) Oral every 8 hours  influenza   Vaccine 0.5 milliLiter(s) IntraMuscular once  insulin glargine Injectable (LANTUS) 14 Unit(s) SubCutaneous at bedtime  insulin lispro (HumaLOG) corrective regimen sliding scale   SubCutaneous three times a day before meals  insulin lispro (HumaLOG) corrective regimen sliding scale   SubCutaneous at bedtime  isosorbide   mononitrate ER Tablet (IMDUR) 30 milliGRAM(s) Oral daily  metoprolol succinate ER 50 milliGRAM(s) Oral daily  pantoprazole    Tablet 40 milliGRAM(s) Oral before breakfast  polyethylene glycol 3350 17 Gram(s) Oral daily  senna 2 Tablet(s) Oral at bedtime      PAST MEDICAL & SURGICAL HISTORY:  Diabetes  HTN (hypertension)  Stented coronary artery  Coronary artery disease  Mitral regurgitation  Obesity  PVD (peripheral vascular disease)  S/P femoral-popliteal bypass surgery  History of appendectomy      Vitals:  T(F): 98.3 (09-14), Max: 98.8 (09-13)  HR: 77 (09-14) (69 - 80)  BP: 128/72 (09-14) (128/72 - 157/72)  RR: 17 (09-14)  SpO2: 94% (09-14)      Physical Exam:  Appearance: No acute distress; well appearing  Eyes: PERRL, EOMI, pink conjunctiva  HENT: Normal oral mucosa  Cardiovascular: RRR, S1, S2, no murmurs, rubs, or gallops; trace edema; no JVD  Respiratory: Clear to auscultation bilaterally  Gastrointestinal: soft, non-tender, non-distended with normal bowel sounds  Musculoskeletal: No clubbing; no joint deformity   Neurologic: Non-focal  Lymphatic: No lymphadenopathy  Psychiatry: AAOx3, mood & affect appropriate  Skin: No rashes, ecchymoses, or cyanosis               I&O's Summary    13 Sep 2018 07:01  -  14 Sep 2018 07:00  --------------------------------------------------------  IN: 1380 mL / OUT: 3150 mL / NET: -1770 mL      LABS:             8.3    8.83  )-----------( 212      ( 13 Sep 2018 08:00 )             27.0     09-13  142  |  105  |  30<H>  ----------------------------<  114<H>  3.9   |  23  |  1.46<H>    Ca    8.8      13 Sep 2018 05:19  Phos  4.1     09-13  Mg     2.0     09-13      Serum Pro-Brain Natriuretic Peptide: 1167 pg/mL (09-10 @ 16:53)      TTE with Doppler (w/Cont) (09.11.18 @ 15:39) >  ------------------------------------------------------------------------  Conclusions:  1. Mild mitral regurgitation.  2. Moderate left atrial enlargement.  3. Normal left ventricular internaldimensions and wall  thicknesses.  4. Endocardial visualization enhanced with intravenous  injection of Ultrasonic Enhancing Agent (Definity). Normal  left ventricular systolic function. No segmental wall  motion abnormalities.  5. Normal right ventricular size and function.  6. Mild-moderate tricuspid regurgitation.  7. Estimated pulmonary artery systolic pressure equals 46  mm Hg, assuming right atrial pressure equals 8 mm Hg,  consistent with mild pulmonary pressures.  *** No previous Echo exam.  ------------------------------------------------------------------------  Confirmed on  9/11/2018 - 18:21:20 by Cirilo Gutierrez M.D.  ------------------------------------------------------------------------      Interpretation of Telemetry: sinus 60s to 80s

## 2018-09-14 NOTE — PROGRESS NOTE ADULT - SUBJECTIVE AND OBJECTIVE BOX
Patient is a 58y old  Male who presents with a chief complaint of shortness of breath and abdominal swelling (14 Sep 2018 08:13)      SUBJECTIVE / OVERNIGHT EVENTS: Patient seen and examined at bedside - patient feels much better, patient denies chest pain or shortness or breath.     ROS:  All other review of systems negative    Allergies    No Known Allergies    Intolerances        MEDICATIONS  (STANDING):  aspirin enteric coated 81 milliGRAM(s) Oral daily  atorvastatin 40 milliGRAM(s) Oral at bedtime  buDESOnide 160 MICROgram(s)/formoterol 4.5 MICROgram(s) Inhaler 2 Puff(s) Inhalation two times a day  clopidogrel Tablet 75 milliGRAM(s) Oral daily  dextrose 5%. 1000 milliLiter(s) (50 mL/Hr) IV Continuous <Continuous>  dextrose 50% Injectable 12.5 Gram(s) IV Push once  dextrose 50% Injectable 25 Gram(s) IV Push once  dextrose 50% Injectable 25 Gram(s) IV Push once  docusate sodium 100 milliGRAM(s) Oral three times a day  enoxaparin Injectable 40 milliGRAM(s) SubCutaneous every 24 hours  folic acid 1 milliGRAM(s) Oral daily  furosemide    Tablet 40 milliGRAM(s) Oral two times a day  gabapentin 300 milliGRAM(s) Oral three times a day  hydrALAZINE 50 milliGRAM(s) Oral every 8 hours  influenza   Vaccine 0.5 milliLiter(s) IntraMuscular once  insulin glargine Injectable (LANTUS) 14 Unit(s) SubCutaneous at bedtime  insulin lispro (HumaLOG) corrective regimen sliding scale   SubCutaneous three times a day before meals  insulin lispro (HumaLOG) corrective regimen sliding scale   SubCutaneous at bedtime  isosorbide   mononitrate ER Tablet (IMDUR) 30 milliGRAM(s) Oral daily  metoprolol succinate ER 50 milliGRAM(s) Oral daily  pantoprazole    Tablet 40 milliGRAM(s) Oral before breakfast  polyethylene glycol 3350 17 Gram(s) Oral daily  senna 2 Tablet(s) Oral at bedtime    MEDICATIONS  (PRN):  ALBUTerol    90 MICROgram(s) HFA Inhaler 2 Puff(s) Inhalation every 6 hours PRN Shortness of Breath and/or Wheezing  dextrose 40% Gel 15 Gram(s) Oral once PRN Blood Glucose LESS THAN 70 milliGRAM(s)/deciliter  glucagon  Injectable 1 milliGRAM(s) IntraMuscular once PRN Glucose LESS THAN 70 milligrams/deciliter      Vital Signs Last 24 Hrs  T(C): 36.8 (14 Sep 2018 11:18), Max: 37.1 (13 Sep 2018 20:34)  T(F): 98.2 (14 Sep 2018 11:18), Max: 98.8 (13 Sep 2018 20:34)  HR: 67 (14 Sep 2018 11:18) (67 - 80)  BP: 161/90 (14 Sep 2018 11:18) (128/72 - 161/90)  BP(mean): --  RR: 18 (14 Sep 2018 11:18) (17 - 18)  SpO2: 98% (14 Sep 2018 11:18) (94% - 98%)  CAPILLARY BLOOD GLUCOSE      POCT Blood Glucose.: 174 mg/dL (14 Sep 2018 11:53)  POCT Blood Glucose.: 139 mg/dL (14 Sep 2018 07:58)  POCT Blood Glucose.: 184 mg/dL (13 Sep 2018 21:17)  POCT Blood Glucose.: 161 mg/dL (13 Sep 2018 16:30)    I&O's Summary    13 Sep 2018 07:01  -  14 Sep 2018 07:00  --------------------------------------------------------  IN: 1380 mL / OUT: 3150 mL / NET: -1770 mL    14 Sep 2018 07:01  -  14 Sep 2018 12:15  --------------------------------------------------------  IN: 0 mL / OUT: 525 mL / NET: -525 mL        PHYSICAL EXAM:  GENERAL: NAD, well-developed  HEAD:  Atraumatic, Normocephalic  EYES: EOMI, PERRLA, conjunctiva and sclera clear  NECK: Supple, No JVD  CHEST/LUNG: Clear to auscultation bilaterally; No wheeze  HEART: Regular rate and rhythm; No murmurs, rubs, or gallops  ABDOMEN: Soft, Nontender, Nondistended; Bowel sounds present  EXTREMITIES:  2+ Peripheral Pulses, No clubbing, cyanosis, or edema  NEUROLOGY: AAOx3, non-focal  PSYCH: calm  SKIN: No rashes or lesions    LABS:                        8.3    8.83  )-----------( 212      ( 13 Sep 2018 08:00 )             27.0     09-13    142  |  105  |  30<H>  ----------------------------<  114<H>  3.9   |  23  |  1.46<H>    Ca    8.8      13 Sep 2018 05:19  Phos  4.1     09-13  Mg     2.0     09-13              Consultant(s) Notes Reviewed:  cardiology

## 2018-09-14 NOTE — PROGRESS NOTE ADULT - ASSESSMENT
57 yo male with PMHx COPD, CAD w/ recent PCI in June 2018, HTN, PVD b/l fem/pop bypass, type 2 DM presents here with acute on chronic diastolic HF, restarted on lasix with improvement

## 2018-09-14 NOTE — DISCHARGE NOTE ADULT - MEDICATION SUMMARY - MEDICATIONS TO STOP TAKING
I will STOP taking the medications listed below when I get home from the hospital:    losartan 50 mg oral tablet  -- 1 tab(s) by mouth once a day    insulin glargine 100 units/mL subcutaneous solution  -- 14 unit(s) subcutaneous once a day    Crestor 40 mg oral tablet  -- 1 tab(s) by mouth once a day (at bedtime)    Vitamin B-100 oral tablet  -- 1 tab(s) by mouth once a day    acetaminophen 500 mg oral tablet  -- 1 tab(s) by mouth every 6 hours, As Needed    Ecotrin Adult Low Strength 81 mg oral delayed release tablet  -- 1 tab(s) by mouth once a day    amLODIPine 10 mg oral tablet  -- 1 tab(s) by mouth once a day    metoprolol succinate 50 mg oral tablet, extended release  -- 1 tab(s) by mouth once a day    acetaminophen 500 mg oral tablet  -- 1 tab(s) by mouth , As Needed    losartan 100 mg oral tablet  -- 1 tab(s) by mouth once a day    simvastatin 20 mg oral tablet  -- 1 tab(s) by mouth once a day (at bedtime) I will STOP taking the medications listed below when I get home from the hospital:    losartan 50 mg oral tablet  -- 1 tab(s) by mouth once a day    insulin glargine 100 units/mL subcutaneous solution  -- 14 unit(s) subcutaneous once a day    Crestor 40 mg oral tablet  -- 1 tab(s) by mouth once a day (at bedtime)    Vitamin B-100 oral tablet  -- 1 tab(s) by mouth once a day    acetaminophen 500 mg oral tablet  -- 1 tab(s) by mouth every 6 hours, As Needed    Ecotrin Adult Low Strength 81 mg oral delayed release tablet  -- 1 tab(s) by mouth once a day    amLODIPine 10 mg oral tablet  -- 1 tab(s) by mouth once a day    metFORMIN 850 mg oral tablet  -- 1 tab(s) by mouth 2 times a day    metoprolol succinate 50 mg oral tablet, extended release  -- 1 tab(s) by mouth once a day    acetaminophen 500 mg oral tablet  -- 1 tab(s) by mouth , As Needed    losartan 100 mg oral tablet  -- 1 tab(s) by mouth once a day    simvastatin 20 mg oral tablet  -- 1 tab(s) by mouth once a day (at bedtime)

## 2018-09-14 NOTE — PROGRESS NOTE ADULT - PROBLEM SELECTOR PLAN 1
Will c/w lasix 40mg oral BID now improved renal function.   Hold ARB for now.   Echo with EF 60 %, mild MR, TR.  Likely diastolic dysfunction.  Cardiology following - increase isosorbide MN to 60 mg and increase metoprolol to 75 daily.   c/w hydralazine

## 2018-09-14 NOTE — PROGRESS NOTE ADULT - PROBLEM SELECTOR PLAN 2
- c/w aspirin, Plavix, statin, metoprolol, hydralazine, and imdur  - hold losartan given SARKIS  - s/p cath and PCI at [facility], additional lesions. Per card gavin no plan for additional ischemic w/u here. To follow up at [facility] for additional intervention.

## 2018-09-14 NOTE — DISCHARGE NOTE ADULT - MEDICATION SUMMARY - MEDICATIONS TO TAKE
I will START or STAY ON the medications listed below when I get home from the hospital:    aspirin 81 mg oral delayed release tablet  -- 1 tab(s) by mouth once a day  -- Indication: For Antiplatelet    isosorbide mononitrate 60 mg oral tablet, extended release  -- 1 tab(s) by mouth once a day  -- Indication: For CAD    gabapentin 300 mg oral capsule  -- 1 cap(s) by mouth 3 times a day  -- Indication: For Pain    metFORMIN 850 mg oral tablet  -- 1 tab(s) by mouth 2 times a day  -- Indication: For Diabetes    Basaglar KwikPen 100 units/mL subcutaneous solution  -- 14 unit(s) subcutaneous once a day (at bedtime)  -- Indication: For Diabetes    glimepiride 4 mg oral tablet  -- 1 tab(s) by mouth once a day  -- Indication: For Diabetes    atorvastatin 40 mg oral tablet  -- 1 tab(s) by mouth once a day (at bedtime)  -- Indication: For Cholestrol     clopidogrel 75 mg oral tablet  -- 1 tab(s) by mouth once a day  -- Indication: For Antiplatelet    metoprolol succinate 25 mg oral tablet, extended release  -- 3 tab(s) by mouth once a day  -- Indication: For HF    albuterol 90 mcg/inh inhalation aerosol  -- 2 puff(s) inhaled every 6 hours, As Needed  -- Indication: For bronchospasm    Symbicort 160 mcg-4.5 mcg/inh inhalation aerosol  -- 2 puff(s) inhaled 2 times a day  -- Indication: For bronchospasm    furosemide 40 mg oral tablet  -- 1 tab(s) by mouth 2 times a day  -- Indication: For Heart failure    senna oral tablet  -- 2 tab(s) by mouth once a day (at bedtime)  -- Indication: For Constipation    docusate sodium 100 mg oral capsule  -- 1 cap(s) by mouth 3 times a day  -- Indication: For Constipation    polyethylene glycol 3350 oral powder for reconstitution  -- 17 gram(s) by mouth once a day  -- Indication: For Constipation    omeprazole 20 mg oral delayed release tablet  -- 1 tab(s) by mouth once a day  -- Indication: For GERD    hydrALAZINE 50 mg oral tablet  -- 1 tab(s) by mouth every 8 hours  -- Indication: For Hypertension    Vitamin B Complex oral tablet  -- 1 tab(s) by mouth once a day  -- Indication: For Supplement    folic acid 1 mg oral tablet  -- 1 tab(s) by mouth once a day  -- Indication: For Supplement I will START or STAY ON the medications listed below when I get home from the hospital:    aspirin 81 mg oral delayed release tablet  -- 1 tab(s) by mouth once a day  -- Indication: For Antiplatelet    isosorbide mononitrate 60 mg oral tablet, extended release  -- 1 tab(s) by mouth once a day  -- Indication: For CAD    gabapentin 300 mg oral capsule  -- 1 cap(s) by mouth 3 times a day  -- Indication: For Pain    Basaglar KwikPen 100 units/mL subcutaneous solution  -- 14 unit(s) subcutaneous once a day (at bedtime)  -- Indication: For Diabetes    glimepiride 4 mg oral tablet  -- 1 tab(s) by mouth once a day  -- Indication: For Diabetes    atorvastatin 40 mg oral tablet  -- 1 tab(s) by mouth once a day (at bedtime)  -- Indication: For Cholestrol     clopidogrel 75 mg oral tablet  -- 1 tab(s) by mouth once a day  -- Indication: For Antiplatelet    metoprolol succinate 25 mg oral tablet, extended release  -- 3 tab(s) by mouth once a day  -- Indication: For HF    albuterol 90 mcg/inh inhalation aerosol  -- 2 puff(s) inhaled every 6 hours, As Needed  -- Indication: For bronchospasm    Symbicort 160 mcg-4.5 mcg/inh inhalation aerosol  -- 2 puff(s) inhaled 2 times a day  -- Indication: For bronchospasm    furosemide 40 mg oral tablet  -- 1 tab(s) by mouth 2 times a day  -- Indication: For Heart failure    senna oral tablet  -- 2 tab(s) by mouth once a day (at bedtime)  -- Indication: For Constipation    docusate sodium 100 mg oral capsule  -- 1 cap(s) by mouth 3 times a day  -- Indication: For Constipation    polyethylene glycol 3350 oral powder for reconstitution  -- 17 gram(s) by mouth once a day  -- Indication: For Constipation    omeprazole 20 mg oral delayed release tablet  -- 1 tab(s) by mouth once a day  -- Indication: For GERD    hydrALAZINE 50 mg oral tablet  -- 1 tab(s) by mouth every 8 hours  -- Indication: For Hypertension    Vitamin B Complex oral tablet  -- 1 tab(s) by mouth once a day  -- Indication: For Supplement    folic acid 1 mg oral tablet  -- 1 tab(s) by mouth once a day  -- Indication: For Supplement

## 2018-09-14 NOTE — PROGRESS NOTE ADULT - ASSESSMENT
58 year old M with a PMH of CAD w/ recent PCI (2018), HTN, PVD (b/l fem bypass), and DM that presents with worsening SOB and LE edema likely 2/2 CHF. Pt improving w/ diuresis.   [place]: diagnostic cardiac catheterization which revealed 40% dLM lesion (IVUS 5.0mm2), 85% pLAD lesion (IVUS <2.0mm2), 90% OM1 lesion, 80% p/m/dRCA lesion, EF:65%, EDP 18mmHg.    #SOB likely 2/2 HFpEF  - Cardiac enzymes negative, likely not acute ischemic process       - Pt will follow up with hx cardiologist at [facility] for coronary revascularization   - c/w PO Lasix 40 BID if planning on d/c can d/c on 40 BID  - Pt will be following up w/ Dr Callahan.   - check daily weights.   - Continue to monitor I and Os  - c/w hydralazine, metoprolol  - increase isosorbide MN to 60 mg  - Increase metoprolol to 75 daily.   - Monitor Cr.     #CAD  - C/w ASA and Plavix   - start atorvastatin 40 mg daily.       Hussein Núñez MD  Cardiology Fellow - PGY-4  LIFIFI: [id number]  NS: [phone] [id number] 58 year old M with a PMH of CAD w/ recent PCI (2018), HTN, PVD (b/l fem bypass), and DM that presents with worsening SOB and LE edema likely 2/2 CHF. Pt improving w/ diuresis.   [place]: diagnostic cardiac catheterization which revealed 40% dLM lesion (IVUS 5.0mm2), 85% pLAD lesion (IVUS <2.0mm2), 90% OM1 lesion, 80% p/m/dRCA lesion, EF:65%, EDP 18mmHg.      #SOB likely 2/2 HFpEF  - Cardiac enzymes negative, likely not acute ischemic process       - Pt will follow up with hx cardiologist at [facility] for coronary revascularization as previously planned.  - c/w PO Lasix 40 BID; if planning on d/c, can d/c on 40 BID  - Pt will be following up w/ Dr Callahan and with Dr. De La Rosa at [facility].   - check daily weights.   - Continue to monitor I and Os  - c/w hydralazine  - increase isosorbide MN to 60 mg  - Increase metoprolol to 75 daily.   - Monitor Cr.     #CAD  - C/W ASA and Plavix   - start atorvastatin 40 mg daily.       Hussein Núñez MD  Cardiology Fellow - PGY-4  LIFIFI: [id number]  NS: [phone]  [id number]    Barrie Pacheco M.D.  Cardiology Consult Service  [phone] or [phone]

## 2018-09-14 NOTE — DISCHARGE NOTE ADULT - ADDITIONAL INSTRUCTIONS
Pt will be following up w/ Dr Callahan and with Dr. De La Rosa at [facility] in 3-5 days. Pt will be following up w/ Dr Callahan and with [facility]. [facility] Rosa at [facility] in 3-5 days.  Holding metformin for now due to elevated creatinine   and f/u with your PCP in 3-5 days to monitor serum creat.

## 2018-09-14 NOTE — DISCHARGE NOTE ADULT - CARE PLAN
Principal Discharge DX:	CHF (congestive heart failure)  Goal:	symptoms improved  Assessment and plan of treatment:	- c/w PO Lasix 40 BID     - check daily weights.   - Continue to monitor I and Os  - c/w hydralazine  - increase isosorbide MN to 60 mg  - Increase metoprolol to 75 daily.   - Monitor Cr.  - Pt will be following up w/ Dr Callahan and with Dr. De La Rosa at [facility].  Secondary Diagnosis:	Coronary artery disease  Assessment and plan of treatment:	- C/W ASA and Plavix   - start atorvastatin 40 mg daily  Secondary Diagnosis:	Abdominal distention  Assessment and plan of treatment:	- abd leonideso without ascites  - suffers from chronic constipation  - bowel regimen.  Secondary Diagnosis:	T2DM (type 2 diabetes mellitus)  Assessment and plan of treatment:	Your next appointment with endocrinologist ([facility] endocrine ph: [phone])/PMD is -   HgA1C this admission -  Make sure you get your HgA1c checked every three months.  If you take oral diabetes medications, check your blood glucose two times a day.  If you take insulin, check your blood glucose before meals and at bedtime.  It's important not to skip any meals.  Keep a log of your blood glucose results and always take it with you to your doctor appointments.  Keep a list of your current medications including injectables and over the counter medications and bring this medication list with you to all your doctor appointments.  If you have not seen your ophthalmologist this year call for appointment.  Check your feet daily for redness, sores, or openings. Do not self treat. If no improvement in two days call your primary care physician for an appointment.  Low blood sugar (hypoglycemia) is a blood sugar below 70mg/dl. Check your blood sugar if you feel signs/symptoms of hypoglycemia. If your blood sugar is below 70 take 15 grams of carbohydrates (ex 4 oz of apple juice, 3-4 glucose tablets, or 4-6 oz of regular soda) wait 15 minutes and repeat blood sugar to make sure it comes up above 70.  If your blood sugar is above 70 and you are due for a meal, have a meal.  If you are not due for a meal have a snack.  This snack helps keeps your blood sugar at a safe range.

## 2018-09-14 NOTE — DISCHARGE NOTE ADULT - PLAN OF CARE
symptoms improved - c/w PO Lasix 40 BID     - check daily weights.   - Continue to monitor I and Os  - c/w hydralazine  - increase isosorbide MN to 60 mg  - Increase metoprolol to 75 daily.   - Monitor Cr.  - Pt will be following up w/ Dr Callahan and with Dr. De La Rosa at [facility] - C/W ASA and Plavix   - start atorvastatin 40 mg daily - abd sono without ascites  - suffers from chronic constipation  - bowel regimen. Your next appointment with endocrinologist ([facility] endocrine ph: [phone])/PMD is -   HgA1C this admission -  Make sure you get your HgA1c checked every three months.  If you take oral diabetes medications, check your blood glucose two times a day.  If you take insulin, check your blood glucose before meals and at bedtime.  It's important not to skip any meals.  Keep a log of your blood glucose results and always take it with you to your doctor appointments.  Keep a list of your current medications including injectables and over the counter medications and bring this medication list with you to all your doctor appointments.  If you have not seen your ophthalmologist this year call for appointment.  Check your feet daily for redness, sores, or openings. Do not self treat. If no improvement in two days call your primary care physician for an appointment.  Low blood sugar (hypoglycemia) is a blood sugar below 70mg/dl. Check your blood sugar if you feel signs/symptoms of hypoglycemia. If your blood sugar is below 70 take 15 grams of carbohydrates (ex 4 oz of apple juice, 3-4 glucose tablets, or 4-6 oz of regular soda) wait 15 minutes and repeat blood sugar to make sure it comes up above 70.  If your blood sugar is above 70 and you are due for a meal, have a meal.  If you are not due for a meal have a snack.  This snack helps keeps your blood sugar at a safe range.

## 2018-09-14 NOTE — DISCHARGE NOTE ADULT - HOSPITAL COURSE
58 year old M with a PMH of CAD w/ recent PCI (2018), HTN, PVD (b/l fem bypass), and DM that presents with worsening SOB and LE edema likely 2/2 CHF. Pt improving w/ diuresis.   [place] [facility]: diagnostic cardiac catheterization which revealed 40% dLM lesion (IVUS 5.0mm2), 85% pLAD lesion (IVUS <2.0mm2), 90% OM1 lesion, 80% p/m/dRCA lesion, EF:65%, EDP 18mmHg.    - c/w PO Lasix 40 BID  - check daily weights.   - Continue to monitor I and Os  - c/w hydralazine  - increase isosorbide MN to 60 mg  - Increase metoprolol to 75 daily.   - C/W ASA and Plavix   - start atorvastatin 40 mg daily.   - Monitor Cr.   - Pt will be following up w/ Dr Callahan and with Dr. De La Rosa at [facility].

## 2018-09-14 NOTE — DISCHARGE NOTE ADULT - PATIENT PORTAL LINK FT
You can access the [facility] Patient Portal, offered by [facility], by registering with the following website: [email or web address]

## 2018-09-14 NOTE — DISCHARGE NOTE ADULT - CARE PROVIDER_API CALL
Gary De La Rosa (MD), Cardiovascular Disease; Internal Medicine  [address] [place] [address]  Phone: [phone]  Fax: [phone]

## 2018-09-18 ENCOUNTER — INPATIENT (INPATIENT)
Facility: HOSPITAL | Age: 58
LOS: 0 days | Discharge: HOME | DRG: 247 | End: 2018-09-19
Attending: INTERNAL MEDICINE | Admitting: INTERNAL MEDICINE
Payer: COMMERCIAL

## 2018-09-18 DIAGNOSIS — N18.3 CHRONIC KIDNEY DISEASE, STAGE 3 (MODERATE): ICD-10-CM

## 2018-09-18 DIAGNOSIS — E78.5 HYPERLIPIDEMIA, UNSPECIFIED: ICD-10-CM

## 2018-09-18 DIAGNOSIS — Z79.84 LONG TERM (CURRENT) USE OF ORAL HYPOGLYCEMIC DRUGS: ICD-10-CM

## 2018-09-18 DIAGNOSIS — E11.22 TYPE 2 DIABETES MELLITUS WITH DIABETIC CHRONIC KIDNEY DISEASE: ICD-10-CM

## 2018-09-18 DIAGNOSIS — Z95.828 PRESENCE OF OTHER VASCULAR IMPLANTS AND GRAFTS: Chronic | ICD-10-CM

## 2018-09-18 DIAGNOSIS — I73.9 PERIPHERAL VASCULAR DISEASE, UNSPECIFIED: ICD-10-CM

## 2018-09-18 DIAGNOSIS — I34.0 NONRHEUMATIC MITRAL (VALVE) INSUFFICIENCY: ICD-10-CM

## 2018-09-18 DIAGNOSIS — I50.32 CHRONIC DIASTOLIC (CONGESTIVE) HEART FAILURE: ICD-10-CM

## 2018-09-18 DIAGNOSIS — I25.10 ATHEROSCLEROTIC HEART DISEASE OF NATIVE CORONARY ARTERY WITHOUT ANGINA PECTORIS: ICD-10-CM

## 2018-09-18 DIAGNOSIS — Z90.49 ACQUIRED ABSENCE OF OTHER SPECIFIED PARTS OF DIGESTIVE TRACT: Chronic | ICD-10-CM

## 2018-09-18 DIAGNOSIS — F17.210 NICOTINE DEPENDENCE, CIGARETTES, UNCOMPLICATED: ICD-10-CM

## 2018-09-18 DIAGNOSIS — Z79.82 LONG TERM (CURRENT) USE OF ASPIRIN: ICD-10-CM

## 2018-09-18 DIAGNOSIS — I13.0 HYPERTENSIVE HEART AND CHRONIC KIDNEY DISEASE WITH HEART FAILURE AND STAGE 1 THROUGH STAGE 4 CHRONIC KIDNEY DISEASE, OR UNSPECIFIED CHRONIC KIDNEY DISEASE: ICD-10-CM

## 2018-09-18 LAB
ALBUMIN SERPL ELPH-MCNC: 4.8 G/DL — SIGNIFICANT CHANGE UP (ref 3.3–5)
ALP SERPL-CCNC: 104 U/L — SIGNIFICANT CHANGE UP (ref 40–120)
ALT FLD-CCNC: 23 U/L — SIGNIFICANT CHANGE UP (ref 10–45)
ANION GAP SERPL CALC-SCNC: 13 MMOL/L — SIGNIFICANT CHANGE UP (ref 5–17)
APTT BLD: 31.7 SEC — SIGNIFICANT CHANGE UP (ref 27.5–37.4)
AST SERPL-CCNC: 38 U/L — SIGNIFICANT CHANGE UP (ref 10–40)
BASOPHILS NFR BLD AUTO: 0.5 % — SIGNIFICANT CHANGE UP (ref 0–2)
BILIRUB SERPL-MCNC: 0.4 MG/DL — SIGNIFICANT CHANGE UP (ref 0.2–1.2)
BUN SERPL-MCNC: 20 MG/DL — SIGNIFICANT CHANGE UP (ref 7–23)
CALCIUM SERPL-MCNC: 9.4 MG/DL — SIGNIFICANT CHANGE UP (ref 8.4–10.5)
CHLORIDE SERPL-SCNC: 98 MMOL/L — SIGNIFICANT CHANGE UP (ref 96–108)
CHOLEST SERPL-MCNC: 162 MG/DL — SIGNIFICANT CHANGE UP (ref 10–199)
CK MB CFR SERPL CALC: 3.6 NG/ML — SIGNIFICANT CHANGE UP (ref 0–6.7)
CK SERPL-CCNC: 522 U/L — HIGH (ref 30–200)
CO2 SERPL-SCNC: 30 MMOL/L — SIGNIFICANT CHANGE UP (ref 22–31)
CREAT SERPL-MCNC: 1.25 MG/DL — SIGNIFICANT CHANGE UP (ref 0.5–1.3)
CRP SERPL-MCNC: 0.57 MG/DL — HIGH (ref 0–0.4)
EOSINOPHIL NFR BLD AUTO: 2.8 % — SIGNIFICANT CHANGE UP (ref 0–6)
GLUCOSE BLDC GLUCOMTR-MCNC: 138 MG/DL — HIGH (ref 70–99)
GLUCOSE SERPL-MCNC: 180 MG/DL — HIGH (ref 70–99)
HBA1C BLD-MCNC: 8.2 % — HIGH (ref 4–5.6)
HCT VFR BLD CALC: 36 % — LOW (ref 39–50)
HDLC SERPL-MCNC: 56 MG/DL — SIGNIFICANT CHANGE UP
HGB BLD-MCNC: 10.6 G/DL — LOW (ref 13–17)
INR BLD: 1.01 — SIGNIFICANT CHANGE UP (ref 0.88–1.16)
LIPID PNL WITH DIRECT LDL SERPL: 74 MG/DL — SIGNIFICANT CHANGE UP
LYMPHOCYTES # BLD AUTO: 32.4 % — SIGNIFICANT CHANGE UP (ref 13–44)
MCHC RBC-ENTMCNC: 20.8 PG — LOW (ref 27–34)
MCHC RBC-ENTMCNC: 29.4 G/DL — LOW (ref 32–36)
MCV RBC AUTO: 70.7 FL — LOW (ref 80–100)
MONOCYTES NFR BLD AUTO: 8.8 % — SIGNIFICANT CHANGE UP (ref 2–14)
NEUTROPHILS NFR BLD AUTO: 55.5 % — SIGNIFICANT CHANGE UP (ref 43–77)
PLATELET # BLD AUTO: 266 K/UL — SIGNIFICANT CHANGE UP (ref 150–400)
POTASSIUM SERPL-MCNC: 3.7 MMOL/L — SIGNIFICANT CHANGE UP (ref 3.5–5.3)
POTASSIUM SERPL-SCNC: 3.7 MMOL/L — SIGNIFICANT CHANGE UP (ref 3.5–5.3)
PROT SERPL-MCNC: 8.8 G/DL — HIGH (ref 6–8.3)
PROTHROM AB SERPL-ACNC: 11.2 SEC — SIGNIFICANT CHANGE UP (ref 9.8–12.7)
RBC # BLD: 5.09 M/UL — SIGNIFICANT CHANGE UP (ref 4.2–5.8)
RBC # FLD: 16.8 % — SIGNIFICANT CHANGE UP (ref 10.3–16.9)
SODIUM SERPL-SCNC: 141 MMOL/L — SIGNIFICANT CHANGE UP (ref 135–145)
TOTAL CHOLESTEROL/HDL RATIO MEASUREMENT: 2.9 RATIO — LOW (ref 3.4–9.6)
TRIGL SERPL-MCNC: 158 MG/DL — HIGH (ref 10–149)
WBC # BLD: 12.4 K/UL — HIGH (ref 3.8–10.5)
WBC # FLD AUTO: 12.4 K/UL — HIGH (ref 3.8–10.5)

## 2018-09-18 PROCEDURE — 93458 L HRT ARTERY/VENTRICLE ANGIO: CPT | Mod: 26,XU

## 2018-09-18 PROCEDURE — 92978 ENDOLUMINL IVUS OCT C 1ST: CPT | Mod: 26

## 2018-09-18 PROCEDURE — 93010 ELECTROCARDIOGRAM REPORT: CPT

## 2018-09-18 PROCEDURE — 92928 PRQ TCAT PLMT NTRAC ST 1 LES: CPT | Mod: RC

## 2018-09-18 RX ORDER — TICAGRELOR 90 MG/1
180 TABLET ORAL ONCE
Qty: 0 | Refills: 0 | Status: COMPLETED | OUTPATIENT
Start: 2018-09-18 | End: 2018-09-18

## 2018-09-18 RX ORDER — DEXTROSE 50 % IN WATER 50 %
25 SYRINGE (ML) INTRAVENOUS ONCE
Qty: 0 | Refills: 0 | Status: DISCONTINUED | OUTPATIENT
Start: 2018-09-18 | End: 2018-09-19

## 2018-09-18 RX ORDER — INSULIN LISPRO 100/ML
VIAL (ML) SUBCUTANEOUS
Qty: 0 | Refills: 0 | Status: DISCONTINUED | OUTPATIENT
Start: 2018-09-18 | End: 2018-09-19

## 2018-09-18 RX ORDER — SODIUM CHLORIDE 9 MG/ML
1000 INJECTION, SOLUTION INTRAVENOUS
Qty: 0 | Refills: 0 | Status: DISCONTINUED | OUTPATIENT
Start: 2018-09-18 | End: 2018-09-19

## 2018-09-18 RX ORDER — CLOPIDOGREL BISULFATE 75 MG/1
75 TABLET, FILM COATED ORAL ONCE
Qty: 0 | Refills: 0 | Status: COMPLETED | OUTPATIENT
Start: 2018-09-18 | End: 2018-09-18

## 2018-09-18 RX ORDER — HYDRALAZINE HCL 50 MG
10 TABLET ORAL ONCE
Qty: 0 | Refills: 0 | Status: COMPLETED | OUTPATIENT
Start: 2018-09-18 | End: 2018-09-18

## 2018-09-18 RX ORDER — SODIUM CHLORIDE 9 MG/ML
500 INJECTION, SOLUTION INTRAVENOUS
Qty: 0 | Refills: 0 | Status: DISCONTINUED | OUTPATIENT
Start: 2018-09-18 | End: 2018-09-18

## 2018-09-18 RX ORDER — VITAMIN E 100 UNIT
400 CAPSULE ORAL DAILY
Qty: 0 | Refills: 0 | Status: DISCONTINUED | OUTPATIENT
Start: 2018-09-18 | End: 2018-09-19

## 2018-09-18 RX ORDER — PANTOPRAZOLE SODIUM 20 MG/1
40 TABLET, DELAYED RELEASE ORAL
Qty: 0 | Refills: 0 | Status: DISCONTINUED | OUTPATIENT
Start: 2018-09-18 | End: 2018-09-19

## 2018-09-18 RX ORDER — ASPIRIN/CALCIUM CARB/MAGNESIUM 324 MG
81 TABLET ORAL DAILY
Qty: 0 | Refills: 0 | Status: DISCONTINUED | OUTPATIENT
Start: 2018-09-18 | End: 2018-09-19

## 2018-09-18 RX ORDER — ISOSORBIDE MONONITRATE 60 MG/1
60 TABLET, EXTENDED RELEASE ORAL DAILY
Qty: 0 | Refills: 0 | Status: DISCONTINUED | OUTPATIENT
Start: 2018-09-18 | End: 2018-09-19

## 2018-09-18 RX ORDER — POTASSIUM CHLORIDE 20 MEQ
40 PACKET (EA) ORAL ONCE
Qty: 0 | Refills: 0 | Status: COMPLETED | OUTPATIENT
Start: 2018-09-18 | End: 2018-09-18

## 2018-09-18 RX ORDER — METOPROLOL TARTRATE 50 MG
75 TABLET ORAL DAILY
Qty: 0 | Refills: 0 | Status: DISCONTINUED | OUTPATIENT
Start: 2018-09-18 | End: 2018-09-19

## 2018-09-18 RX ORDER — ASPIRIN/CALCIUM CARB/MAGNESIUM 324 MG
325 TABLET ORAL ONCE
Qty: 0 | Refills: 0 | Status: COMPLETED | OUTPATIENT
Start: 2018-09-18 | End: 2018-09-18

## 2018-09-18 RX ORDER — GABAPENTIN 400 MG/1
300 CAPSULE ORAL THREE TIMES A DAY
Qty: 0 | Refills: 0 | Status: DISCONTINUED | OUTPATIENT
Start: 2018-09-18 | End: 2018-09-19

## 2018-09-18 RX ORDER — INSULIN GLARGINE 100 [IU]/ML
14 INJECTION, SOLUTION SUBCUTANEOUS AT BEDTIME
Qty: 0 | Refills: 0 | Status: DISCONTINUED | OUTPATIENT
Start: 2018-09-18 | End: 2018-09-19

## 2018-09-18 RX ORDER — INSULIN LISPRO 100/ML
VIAL (ML) SUBCUTANEOUS ONCE
Qty: 0 | Refills: 0 | Status: COMPLETED | OUTPATIENT
Start: 2018-09-18 | End: 2018-09-18

## 2018-09-18 RX ORDER — BUDESONIDE AND FORMOTEROL FUMARATE DIHYDRATE 160; 4.5 UG/1; UG/1
2 AEROSOL RESPIRATORY (INHALATION)
Qty: 0 | Refills: 0 | Status: DISCONTINUED | OUTPATIENT
Start: 2018-09-18 | End: 2018-09-19

## 2018-09-18 RX ORDER — FUROSEMIDE 40 MG
40 TABLET ORAL
Qty: 0 | Refills: 0 | Status: DISCONTINUED | OUTPATIENT
Start: 2018-09-18 | End: 2018-09-19

## 2018-09-18 RX ORDER — FOLIC ACID 0.8 MG
1 TABLET ORAL DAILY
Qty: 0 | Refills: 0 | Status: DISCONTINUED | OUTPATIENT
Start: 2018-09-18 | End: 2018-09-19

## 2018-09-18 RX ORDER — HYDRALAZINE HCL 50 MG
50 TABLET ORAL EVERY 8 HOURS
Qty: 0 | Refills: 0 | Status: DISCONTINUED | OUTPATIENT
Start: 2018-09-18 | End: 2018-09-19

## 2018-09-18 RX ORDER — SENNA PLUS 8.6 MG/1
2 TABLET ORAL AT BEDTIME
Qty: 0 | Refills: 0 | Status: DISCONTINUED | OUTPATIENT
Start: 2018-09-18 | End: 2018-09-19

## 2018-09-18 RX ORDER — ATORVASTATIN CALCIUM 80 MG/1
40 TABLET, FILM COATED ORAL AT BEDTIME
Qty: 0 | Refills: 0 | Status: DISCONTINUED | OUTPATIENT
Start: 2018-09-18 | End: 2018-09-19

## 2018-09-18 RX ORDER — DOCUSATE SODIUM 100 MG
100 CAPSULE ORAL THREE TIMES A DAY
Qty: 0 | Refills: 0 | Status: DISCONTINUED | OUTPATIENT
Start: 2018-09-18 | End: 2018-09-19

## 2018-09-18 RX ORDER — DEXTROSE 50 % IN WATER 50 %
15 SYRINGE (ML) INTRAVENOUS ONCE
Qty: 0 | Refills: 0 | Status: DISCONTINUED | OUTPATIENT
Start: 2018-09-18 | End: 2018-09-19

## 2018-09-18 RX ORDER — ALBUTEROL 90 UG/1
2 AEROSOL, METERED ORAL EVERY 6 HOURS
Qty: 0 | Refills: 0 | Status: DISCONTINUED | OUTPATIENT
Start: 2018-09-18 | End: 2018-09-19

## 2018-09-18 RX ORDER — GLUCAGON INJECTION, SOLUTION 0.5 MG/.1ML
1 INJECTION, SOLUTION SUBCUTANEOUS ONCE
Qty: 0 | Refills: 0 | Status: DISCONTINUED | OUTPATIENT
Start: 2018-09-18 | End: 2018-09-19

## 2018-09-18 RX ORDER — DEXTROSE 50 % IN WATER 50 %
12.5 SYRINGE (ML) INTRAVENOUS ONCE
Qty: 0 | Refills: 0 | Status: DISCONTINUED | OUTPATIENT
Start: 2018-09-18 | End: 2018-09-19

## 2018-09-18 RX ORDER — TICAGRELOR 90 MG/1
90 TABLET ORAL
Qty: 0 | Refills: 0 | Status: DISCONTINUED | OUTPATIENT
Start: 2018-09-18 | End: 2018-09-19

## 2018-09-18 RX ADMIN — ATORVASTATIN CALCIUM 40 MILLIGRAM(S): 80 TABLET, FILM COATED ORAL at 21:03

## 2018-09-18 RX ADMIN — Medication 50 MILLIGRAM(S): at 21:03

## 2018-09-18 RX ADMIN — TICAGRELOR 90 MILLIGRAM(S): 90 TABLET ORAL at 17:40

## 2018-09-18 RX ADMIN — Medication 40 MILLIGRAM(S): at 17:35

## 2018-09-18 RX ADMIN — Medication 1 MILLIGRAM(S): at 22:48

## 2018-09-18 RX ADMIN — Medication 40 MILLIEQUIVALENT(S): at 10:04

## 2018-09-18 RX ADMIN — TICAGRELOR 180 MILLIGRAM(S): 90 TABLET ORAL at 13:34

## 2018-09-18 RX ADMIN — Medication 75 MILLIGRAM(S): at 17:35

## 2018-09-18 RX ADMIN — Medication 2: at 10:01

## 2018-09-18 RX ADMIN — BUDESONIDE AND FORMOTEROL FUMARATE DIHYDRATE 2 PUFF(S): 160; 4.5 AEROSOL RESPIRATORY (INHALATION) at 17:38

## 2018-09-18 RX ADMIN — Medication 400 INTERNATIONAL UNIT(S): at 22:48

## 2018-09-18 RX ADMIN — SENNA PLUS 2 TABLET(S): 8.6 TABLET ORAL at 21:03

## 2018-09-18 RX ADMIN — Medication 325 MILLIGRAM(S): at 10:01

## 2018-09-18 RX ADMIN — Medication 100 MILLIGRAM(S): at 21:03

## 2018-09-18 RX ADMIN — INSULIN GLARGINE 14 UNIT(S): 100 INJECTION, SOLUTION SUBCUTANEOUS at 22:52

## 2018-09-18 RX ADMIN — GABAPENTIN 300 MILLIGRAM(S): 400 CAPSULE ORAL at 21:03

## 2018-09-18 RX ADMIN — ISOSORBIDE MONONITRATE 60 MILLIGRAM(S): 60 TABLET, EXTENDED RELEASE ORAL at 17:35

## 2018-09-18 RX ADMIN — CLOPIDOGREL BISULFATE 75 MILLIGRAM(S): 75 TABLET, FILM COATED ORAL at 10:01

## 2018-09-18 RX ADMIN — Medication 10 MILLIGRAM(S): at 18:02

## 2018-09-18 RX ADMIN — Medication 10 MILLIGRAM(S): at 14:42

## 2018-09-18 RX ADMIN — Medication 4: at 22:51

## 2018-09-19 ENCOUNTER — TRANSCRIPTION ENCOUNTER (OUTPATIENT)
Age: 58
End: 2018-09-19

## 2018-09-19 VITALS — TEMPERATURE: 98 F

## 2018-09-19 PROBLEM — I25.10 CAD (CORONARY ARTERY DISEASE): Status: ACTIVE | Noted: 2018-09-19

## 2018-09-19 PROBLEM — N18.3 CHRONIC KIDNEY DISEASE, STAGE III (MODERATE): Status: ACTIVE | Noted: 2018-09-19

## 2018-09-19 PROBLEM — E11.9 DIABETES MELLITUS: Status: ACTIVE | Noted: 2018-09-19

## 2018-09-19 PROBLEM — I20.9 ANGINA PECTORIS: Status: ACTIVE | Noted: 2018-09-19

## 2018-09-19 PROBLEM — E78.5 HYPERLIPIDEMIA: Status: ACTIVE | Noted: 2018-09-19

## 2018-09-19 PROBLEM — I10 HYPERTENSION: Status: ACTIVE | Noted: 2018-09-19

## 2018-09-19 LAB
ANION GAP SERPL CALC-SCNC: 11 MMOL/L — SIGNIFICANT CHANGE UP (ref 5–17)
BUN SERPL-MCNC: 17 MG/DL — SIGNIFICANT CHANGE UP (ref 7–23)
CALCIUM SERPL-MCNC: 8.8 MG/DL — SIGNIFICANT CHANGE UP (ref 8.4–10.5)
CHLORIDE SERPL-SCNC: 100 MMOL/L — SIGNIFICANT CHANGE UP (ref 96–108)
CO2 SERPL-SCNC: 30 MMOL/L — SIGNIFICANT CHANGE UP (ref 22–31)
CREAT SERPL-MCNC: 1.17 MG/DL — SIGNIFICANT CHANGE UP (ref 0.5–1.3)
GLUCOSE SERPL-MCNC: 175 MG/DL — HIGH (ref 70–99)
HCT VFR BLD CALC: 31.5 % — LOW (ref 39–50)
HGB BLD-MCNC: 8.9 G/DL — LOW (ref 13–17)
MAGNESIUM SERPL-MCNC: 2.2 MG/DL — SIGNIFICANT CHANGE UP (ref 1.6–2.6)
MCHC RBC-ENTMCNC: 20.1 PG — LOW (ref 27–34)
MCHC RBC-ENTMCNC: 28.3 G/DL — LOW (ref 32–36)
MCV RBC AUTO: 71.1 FL — LOW (ref 80–100)
PLATELET # BLD AUTO: 224 K/UL — SIGNIFICANT CHANGE UP (ref 150–400)
POTASSIUM SERPL-MCNC: 3.7 MMOL/L — SIGNIFICANT CHANGE UP (ref 3.5–5.3)
POTASSIUM SERPL-SCNC: 3.7 MMOL/L — SIGNIFICANT CHANGE UP (ref 3.5–5.3)
RBC # BLD: 4.43 M/UL — SIGNIFICANT CHANGE UP (ref 4.2–5.8)
RBC # FLD: 16.9 % — SIGNIFICANT CHANGE UP (ref 10.3–16.9)
SODIUM SERPL-SCNC: 141 MMOL/L — SIGNIFICANT CHANGE UP (ref 135–145)
WBC # BLD: 11.4 K/UL — HIGH (ref 3.8–10.5)
WBC # FLD AUTO: 11.4 K/UL — HIGH (ref 3.8–10.5)

## 2018-09-19 RX ORDER — ASPIRIN 81 MG/1
81 TABLET, CHEWABLE ORAL
Refills: 0 | Status: ACTIVE | COMMUNITY

## 2018-09-19 RX ORDER — FUROSEMIDE 40 MG/1
40 TABLET ORAL TWICE DAILY
Refills: 1 | Status: ACTIVE | COMMUNITY

## 2018-09-19 RX ORDER — ATORVASTATIN CALCIUM 40 MG/1
40 TABLET, FILM COATED ORAL
Refills: 0 | Status: ACTIVE | COMMUNITY

## 2018-09-19 RX ORDER — ALBUTEROL 90 UG/1
3 AEROSOL, METERED ORAL
Qty: 1 | Refills: 1 | OUTPATIENT
Start: 2018-09-19 | End: 2018-11-17

## 2018-09-19 RX ORDER — TICAGRELOR 90 MG/1
1 TABLET ORAL
Qty: 60 | Refills: 11 | OUTPATIENT
Start: 2018-09-19 | End: 2019-09-13

## 2018-09-19 RX ORDER — GLIMEPIRIDE 4 MG/1
4 TABLET ORAL TWICE DAILY
Qty: 60 | Refills: 0 | Status: ACTIVE | COMMUNITY

## 2018-09-19 RX ORDER — BUDESONIDE AND FORMOTEROL FUMARATE DIHYDRATE 160; 4.5 UG/1; UG/1
160-4.5 AEROSOL RESPIRATORY (INHALATION) TWICE DAILY
Qty: 3 | Refills: 1 | Status: ACTIVE | COMMUNITY

## 2018-09-19 RX ORDER — HYDRALAZINE HYDROCHLORIDE 50 MG/1
50 TABLET ORAL 3 TIMES DAILY
Qty: 270 | Refills: 3 | Status: ACTIVE | COMMUNITY

## 2018-09-19 RX ORDER — ALBUTEROL SULFATE 2.5 MG/3ML
(2.5 MG/3ML) SOLUTION RESPIRATORY (INHALATION)
Refills: 0 | Status: ACTIVE | COMMUNITY

## 2018-09-19 RX ORDER — POTASSIUM CHLORIDE 20 MEQ
40 PACKET (EA) ORAL ONCE
Qty: 0 | Refills: 0 | Status: DISCONTINUED | OUTPATIENT
Start: 2018-09-19 | End: 2018-09-19

## 2018-09-19 RX ORDER — ISOSORBIDE MONONITRATE 60 MG/1
60 TABLET, EXTENDED RELEASE ORAL DAILY
Qty: 30 | Refills: 3 | Status: ACTIVE | COMMUNITY

## 2018-09-19 RX ORDER — GABAPENTIN 300 MG
300 TABLET ORAL 3 TIMES DAILY
Refills: 0 | Status: ACTIVE | COMMUNITY

## 2018-09-19 RX ORDER — METOPROLOL TARTRATE 25 MG/1
25 TABLET, FILM COATED ORAL TWICE DAILY
Qty: 60 | Refills: 0 | Status: ACTIVE | COMMUNITY

## 2018-09-19 RX ORDER — TICAGRELOR 90 MG/1
90 TABLET ORAL
Qty: 14 | Refills: 1 | Status: ACTIVE | COMMUNITY

## 2018-09-19 RX ORDER — INSULIN GLARGINE 100 [IU]/ML
100 INJECTION, SOLUTION SUBCUTANEOUS
Refills: 0 | Status: ACTIVE | COMMUNITY

## 2018-09-19 RX ADMIN — TICAGRELOR 90 MILLIGRAM(S): 90 TABLET ORAL at 06:36

## 2018-09-19 RX ADMIN — PANTOPRAZOLE SODIUM 40 MILLIGRAM(S): 20 TABLET, DELAYED RELEASE ORAL at 06:36

## 2018-09-19 RX ADMIN — BUDESONIDE AND FORMOTEROL FUMARATE DIHYDRATE 2 PUFF(S): 160; 4.5 AEROSOL RESPIRATORY (INHALATION) at 06:36

## 2018-09-19 RX ADMIN — GABAPENTIN 300 MILLIGRAM(S): 400 CAPSULE ORAL at 06:37

## 2018-09-19 RX ADMIN — BUDESONIDE AND FORMOTEROL FUMARATE DIHYDRATE 2 PUFF(S): 160; 4.5 AEROSOL RESPIRATORY (INHALATION) at 02:02

## 2018-09-19 RX ADMIN — Medication 50 MILLIGRAM(S): at 06:37

## 2018-09-19 RX ADMIN — Medication 2: at 07:14

## 2018-09-19 RX ADMIN — Medication 75 MILLIGRAM(S): at 08:05

## 2018-09-19 RX ADMIN — Medication 40 MILLIGRAM(S): at 06:36

## 2018-09-19 RX ADMIN — Medication 100 MILLIGRAM(S): at 06:36

## 2018-09-19 NOTE — DISCHARGE NOTE ADULT - CARE PROVIDERS DIRECT ADDRESSES
[email or web address]

## 2018-09-19 NOTE — DISCHARGE NOTE ADULT - ADDITIONAL INSTRUCTIONS
- Follow up with your Cardiologist Dr. Nava in 1-2 weeks.  YOu are recommended to return for staged intervention of remaining blockages - Follow up with your Cardiologist Dr. Nava in 1-2 weeks.  YOu are recommended to return for staged intervention of remaining blockages.  This procedure will be scheduled by your outpatient cardiology office.

## 2018-09-19 NOTE — DISCHARGE NOTE ADULT - PLAN OF CARE
Please follow up with Dr. De La Rosa in 1-2 weeks.  YOu are recommended to return for staged intervention of remaining disease in about 2-4 weeks.  This will be scheduled through your cardiologit's office. You underwent a cardiac catheterization receiving two stents to your right coronary artery.  YOu have residual disease which you are recommended to return for additional procedure.   - Continue taking aspirin 81mg daily.  please STOP taking Plavix and START taking Brilinta 90mg Twice daily.  DO NOT STOP THESE MEDICATIONS AS THEY PREVENT YOUR STENT FROM CLOSING.      You underwent a coronary angiogram and should wait 3 days before returning to ordinary activities. Do not drive for 2 days. Consult your doctor before returning to vigorous activity. You may return to work in 3-5 days. The catheter from your wrist was removed and you should remove the dressing in 24 hours. You may shower once the dressing is removed, but avoid baths, hot tubs, or swimming for 5 days to prevent infection. If you notice bleeding from the site, hardening and pain at the site, drainage or redness from the site, coolness/paleness of the extremity, swelling, or fever, please call [phone]. Continue all your home blood pressure medications as usual. maintain a low salt diet. Your cholesterol is controlled.  Continue taking Lipitor 40mg daily at bedtime. Your diabetes is not well controlled.  Your HGB A1c is 8.2 and you goal is below 7.  Continue your home diabetes medications as usual.  please follow up with your primary care doctor for further management. Please follow up with your Cardiologist Dr. De La Rosa in 1-2 weeks. You have a history of diastolic heart failure.  Your heart does not pump correctly leading to back up of flow into your lungs and legs.  This can lead to swelling and shortness of breath.   - it is important that you weight yourself daily.  If you notice an increase in your weight by 2-3 pounds in a day or 5 pounds in a week then you need to call your cardiologist.  maintain a low salt diet.  Drink only about 1.5L of water per day.  - Continue to take your lasix 40mg twice daily (water pill). You underwent a cardiac catheterization receiving two stents to your right coronary artery.  YOu have residual disease which you are recommended to return for additional procedure.   - Continue taking aspirin 81mg daily.  please STOP taking Plavix and START taking Brilinta 90mg Twice daily.  DO NOT STOP THESE MEDICATIONS AS THEY PREVENT YOUR STENT FROM CLOSING.      You underwent a coronary angiogram and should wait 3 days before returning to ordinary activities. Do not drive for 2 days. Consult your doctor before returning to vigorous activity. You may return to work in 3-5 days. The catheter from your wrist was removed and you should remove the dressing in 24 hours. You may shower once the dressing is removed, but avoid baths, hot tubs, or swimming for 5 days to prevent infection. If you notice bleeding from the site, hardening and pain at the site, drainage or redness from the site, coolness/paleness of the extremity, swelling, or fever, please call [phone].    ** if you continue to notice shortness of breath this may be related to your brilinta.  please discuss this with Dr. manzo in the office if it is persistent.

## 2018-09-19 NOTE — DISCHARGE NOTE ADULT - CARE PLAN
Principal Discharge DX:	Coronary artery disease  Secondary Diagnosis:	HTN (hypertension)  Secondary Diagnosis:	Hyperlipidemia  Secondary Diagnosis:	Diabetes  Secondary Diagnosis:	Heart failure Principal Discharge DX:	Coronary artery disease  Goal:	Please follow up with Dr. De La Rosa in 1-2 weeks.  YOu are recommended to return for staged intervention of remaining disease in about 2-4 weeks.  This will be scheduled through your cardiologit's office.  Assessment and plan of treatment:	You underwent a cardiac catheterization receiving two stents to your right coronary artery.  YOu have residual disease which you are recommended to return for additional procedure.   - Continue taking aspirin 81mg daily.  please STOP taking Plavix and START taking Brilinta 90mg Twice daily.  DO NOT STOP THESE MEDICATIONS AS THEY PREVENT YOUR STENT FROM CLOSING.      You underwent a coronary angiogram and should wait 3 days before returning to ordinary activities. Do not drive for 2 days. Consult your doctor before returning to vigorous activity. You may return to work in 3-5 days. The catheter from your wrist was removed and you should remove the dressing in 24 hours. You may shower once the dressing is removed, but avoid baths, hot tubs, or swimming for 5 days to prevent infection. If you notice bleeding from the site, hardening and pain at the site, drainage or redness from the site, coolness/paleness of the extremity, swelling, or fever, please call [phone].  Secondary Diagnosis:	HTN (hypertension)  Goal:	Continue all your home blood pressure medications as usual. maintain a low salt diet.  Secondary Diagnosis:	Hyperlipidemia  Goal:	Your cholesterol is controlled.  Continue taking Lipitor 40mg daily at bedtime.  Secondary Diagnosis:	Diabetes  Goal:	Your diabetes is not well controlled.  Your HGB A1c is 8.2 and you goal is below 7.  Continue your home diabetes medications as usual.  please follow up with your primary care doctor for further management.  Secondary Diagnosis:	Heart failure  Goal:	Please follow up with your Cardiologist Dr. De La Rosa in 1-2 weeks.  Assessment and plan of treatment:	You have a history of diastolic heart failure.  Your heart does not pump correctly leading to back up of flow into your lungs and legs.  This can lead to swelling and shortness of breath.   - it is important that you weight yourself daily.  If you notice an increase in your weight by 2-3 pounds in a day or 5 pounds in a week then you need to call your cardiologist.  maintain a low salt diet.  Drink only about 1.5L of water per day.  - Continue to take your lasix 40mg twice daily (water pill). Principal Discharge DX:	Coronary artery disease  Goal:	Please follow up with Dr. De La Rosa in 1-2 weeks.  YOu are recommended to return for staged intervention of remaining disease in about 2-4 weeks.  This will be scheduled through your cardiologit's office.  Assessment and plan of treatment:	You underwent a cardiac catheterization receiving two stents to your right coronary artery.  YOu have residual disease which you are recommended to return for additional procedure.   - Continue taking aspirin 81mg daily.  please STOP taking Plavix and START taking Brilinta 90mg Twice daily.  DO NOT STOP THESE MEDICATIONS AS THEY PREVENT YOUR STENT FROM CLOSING.      You underwent a coronary angiogram and should wait 3 days before returning to ordinary activities. Do not drive for 2 days. Consult your doctor before returning to vigorous activity. You may return to work in 3-5 days. The catheter from your wrist was removed and you should remove the dressing in 24 hours. You may shower once the dressing is removed, but avoid baths, hot tubs, or swimming for 5 days to prevent infection. If you notice bleeding from the site, hardening and pain at the site, drainage or redness from the site, coolness/paleness of the extremity, swelling, or fever, please call [phone].    ** if you continue to notice shortness of breath this may be related to your brilinta.  please discuss this with Dr. de la rosa in the office if it is persistent.  Secondary Diagnosis:	HTN (hypertension)  Goal:	Continue all your home blood pressure medications as usual. maintain a low salt diet.  Secondary Diagnosis:	Hyperlipidemia  Goal:	Your cholesterol is controlled.  Continue taking Lipitor 40mg daily at bedtime.  Secondary Diagnosis:	Diabetes  Goal:	Your diabetes is not well controlled.  Your HGB A1c is 8.2 and you goal is below 7.  Continue your home diabetes medications as usual.  please follow up with your primary care doctor for further management.  Secondary Diagnosis:	Heart failure  Goal:	Please follow up with your Cardiologist Dr. De La Rosa in 1-2 weeks.  Assessment and plan of treatment:	You have a history of diastolic heart failure.  Your heart does not pump correctly leading to back up of flow into your lungs and legs.  This can lead to swelling and shortness of breath.   - it is important that you weight yourself daily.  If you notice an increase in your weight by 2-3 pounds in a day or 5 pounds in a week then you need to call your cardiologist.  maintain a low salt diet.  Drink only about 1.5L of water per day.  - Continue to take your lasix 40mg twice daily (water pill).

## 2018-09-19 NOTE — DISCHARGE NOTE ADULT - PATIENT PORTAL LINK FT
You can access the [facility] Patient Portal, offered by [facility], by registering with the following website: [email or web address]

## 2018-09-19 NOTE — DISCHARGE NOTE ADULT - MEDICATION SUMMARY - MEDICATIONS TO TAKE
I will START or STAY ON the medications listed below when I get home from the hospital:    aspirin 81 mg oral delayed release tablet  -- 1 tab(s) by mouth once a day  -- Indication: For Coronary artery disease    isosorbide mononitrate 60 mg oral tablet, extended release  -- 1 tab(s) by mouth once a day  -- Indication: For blood pressure    gabapentin 300 mg oral capsule  -- 1 cap(s) by mouth 3 times a day  -- Indication: For neuropathy    glimepiride 4 mg oral tablet  -- 1 tab(s) by mouth once a day  -- Indication: For Diabetes    Basaglar KwikPen 100 units/mL subcutaneous solution  -- 14 unit(s) subcutaneous once a day (at bedtime)  -- Indication: For Diabetes    atorvastatin 40 mg oral tablet  -- 1 tab(s) by mouth once a day (at bedtime)  -- Indication: For High cholesterol    ticagrelor 90 mg oral tablet  -- 1 tab(s) by mouth 2 times a day  -- Indication: For Coronary artery disease    metoprolol succinate 25 mg oral tablet, extended release  -- 3 tab(s) by mouth once a day  -- Indication: For blood pressure    albuterol 90 mcg/inh inhalation aerosol  -- 2 puff(s) inhaled every 6 hours, As Needed  -- Indication: For breathing    albuterol 0.63 mg/3 mL (0.021%) inhalation solution  -- 3 milliliter(s) by nebulizer 3 times a day   -- For inhalation only.  It is very important that you take or use this exactly as directed.  Do not skip doses or discontinue unless directed by your doctor.  Obtain medical advice before taking any non-prescription drugs as some may affect the action of this medication.    -- Indication: For breathing    Symbicort 160 mcg-4.5 mcg/inh inhalation aerosol  -- 2 puff(s) inhaled 2 times a day  -- Indication: For breathing    furosemide 40 mg oral tablet  -- 1 tab(s) by mouth 2 times a day  -- Indication: For Heart failure    senna oral tablet  -- 2 tab(s) by mouth once a day (at bedtime)  -- Indication: For Constipation    docusate sodium 100 mg oral capsule  -- 1 cap(s) by mouth 3 times a day  -- Indication: For Constipation    omeprazole 20 mg oral delayed release tablet  -- 1 tab(s) by mouth once a day  -- Indication: For acid reflux    hydrALAZINE 50 mg oral tablet  -- 1 tab(s) by mouth every 8 hours  -- Indication: For blood pressure    Vitamin B Complex oral tablet  -- 1 tab(s) by mouth once a day  -- Indication: For vitamin    vitamin E 400 intl units oral capsule  -- 1 cap(s) by mouth once a day  -- Indication: For vitamin    folic acid 1 mg oral tablet  -- 1 tab(s) by mouth once a day  -- Indication: For vitamin

## 2018-09-19 NOTE — DISCHARGE NOTE ADULT - CARE PROVIDER_API CALL
Michael De La Rosa (DO), Cardiovascular Disease; Interventional Cardiology  [address] [place] [address]  Phone: [phone]  Fax: [phone] Michael De La Rosa (), Cardiovascular Disease; Interventional Cardiology  [address] [place] [address]  Phone: [phone]  Fax: [phone]    Gary De La Rosa (MD), Cardiovascular Disease; Internal Medicine  [address] [place] [address]  Phone: [phone]  Fax: [phone]

## 2018-09-19 NOTE — DISCHARGE NOTE ADULT - HOSPITAL COURSE
58 y.o Male POOR HISTORIAN Current smoker with PMHx of Obesity, HTN, Hyperlipidemia, IDDM, CKD Stage 3 (Cr 1.4 09/13/18), Anemia (recently d/'c'd from [facility] w/ H/H of 8.3/27.0 and not transfused, reportedly negative GI workup done ~ 2yrs ago), PVD s/p fem-pop bypass B/L, Hyperkalemia with ACE-I/ARB therapy,  Chronic Diastolic  HF, Known CAD with prior PCI, most recently LEE pLAD @ [facility] on 07/27/18 with residual 50% dLM w/ MLA 5.1mm2 by IVUS, 90% OM1, 80% p/m/d RCA.   He was scheduled for Stage PCI of known residual disease @ [facility] earlier this month but presented to [facility] with acute on chronic diastolic HF and was diuresed and ultimately discharged home on Lasix BID.  Since, pt reports  mild left sided CP, 4/10 in severity with SOB upon minimal –moderate exertion, resolving within minutes of rest.  Denies palpitations, dizziness, syncope, recent PND/orthopnea, LE edema, or recent decline in his exercise tolerance.  Nuclear Stress test performed on 06/21/18 revealed moderate sized area of anterior apical and inferior wall ischemia, LVEF of 53%.       In light of pt’s risk factors, Known CAD, persistant above CCS Anginal Class 3 symptoms, pt now returns for Stage PCI of known residual lesion(s).      s/p cardiac cath PTCA/LEE x2 mRCA and pRCA, EF 53% by stress test  R. radial @ 330pm. Mangla wanted pt transitioned to Brilinta loaded with 180mg POx1 post procedure. Pt to be on ASA/brilinta. To return for LCx lesion in 2-4 weeks    In holding SBP 190s given IV hydralazine 10mg x1    On arrival to [facility] SBP >200s.  patient did not take home meds.  Given hydralazine 10mg IV x1 and restarted on home BP meds    NITE: pt. on Lantus 14 units at bedtime at home which is ordered, please reconfirm home meds with pt. in am; 58 y.o Male POOR HISTORIAN Current smoker with PMHx of Obesity, HTN, Hyperlipidemia, IDDM, CKD Stage 3 (Cr 1.4 09/13/18), Anemia (recently d/'c'd from [facility] w/ H/H of 8.3/27.0 and not transfused, reportedly negative GI workup done ~ 2yrs ago), PVD s/p fem-pop bypass B/L, Hyperkalemia with ACE-I/ARB therapy,  Chronic Diastolic  HF, Known CAD with prior PCI, most recently LEE pLAD @ [facility] on 07/27/18 with residual 50% dLM w/ MLA 5.1mm2 by IVUS, 90% OM1, 80% p/m/d RCA.   He was scheduled for Stage PCI of known residual disease @ [facility] earlier this month but presented to [facility] with acute on chronic diastolic HF and was diuresed and ultimately discharged home on Lasix BID.  Since, pt reports  mild left sided CP, 4/10 in severity with SOB upon minimal –moderate exertion, resolving within minutes of rest.  Denies palpitations, dizziness, syncope, recent PND/orthopnea, LE edema, or recent decline in his exercise tolerance.  Nuclear Stress test performed on 06/21/18 revealed moderate sized area of anterior apical and inferior wall ischemia, LVEF of 53%.       In light of pt’s risk factors, Known CAD, persistant above CCS Anginal Class 3 symptoms, pt now returns for Stage PCI of known residual lesion(s).      s/p cardiac cath PTCA/LEE x2 mRCA and pRCA, EF 53% by stress test  R. radial @ 330pm. Mangla wanted pt transitioned to Brilinta loaded with 180mg POx1 post procedure. Pt to be on ASA/brilinta. To return for LCx lesion in 2-4 weeks    In holding SBP 190s given IV hydralazine 10mg x1    On arrival to [facility] SBP >200s.  patient did not take home meds.  Given hydralazine 10mg IV x1 and restarted on home BP meds    NITE: pt. on Lantus 14 units at bedtime at home which is ordered, please reconfirm home meds with pt. in am; 58 y.o Male POOR HISTORIAN Current smoker with PMHx of Obesity, HTN, Hyperlipidemia, IDDM, CKD Stage 3 (Cr 1.4 09/13/18), Anemia (recently d/'c'd from [facility] w/ H/H of 8.3/27.0 and not transfused, reportedly negative GI workup done ~ 2yrs ago), PVD s/p fem-pop bypass B/L, Hyperkalemia with ACE-I/ARB therapy,  Chronic Diastolic  HF, Known CAD with prior PCI, most recently LEE pLAD @ [facility] on 07/27/18 with residual 50% dLM w/ MLA 5.1mm2 by IVUS, 90% OM1, 80% p/m/d RCA.  He now returns for staged intervention of remaining disease.  He is s/p cardiac cath receiving a PTCA/LEE x2 mRCA and pRCA.  Patient with residual LCX lesion and recommended to return for staged procedure in 2-4 weeks.  Radial band removed and access site stable with no bleeding, hematoma, and pulses intact.  Patient transitioned from plavix to Brilinta 90mg Twice daily as per Dr. De La Rosa.  He is feeling well in the morning.  Labs, vitals, meds reviewed with Dr. schumacher and patient deemed stable for discharge home.  He will follow up with Dr. De La Rosa and scheduled his staged procedure.  New medications are E prescribed to pharmacy of choice and confirmed patient insurance covers Brilinta.

## 2018-09-20 ENCOUNTER — APPOINTMENT (OUTPATIENT)
Dept: CARDIOLOGY | Facility: CLINIC | Age: 58
End: 2018-09-20

## 2018-09-20 DIAGNOSIS — I10 ESSENTIAL (PRIMARY) HYPERTENSION: ICD-10-CM

## 2018-09-20 DIAGNOSIS — I20.9 ANGINA PECTORIS, UNSPECIFIED: ICD-10-CM

## 2018-09-20 DIAGNOSIS — E78.5 HYPERLIPIDEMIA, UNSPECIFIED: ICD-10-CM

## 2018-09-20 DIAGNOSIS — I25.10 ATHEROSCLEROTIC HEART DISEASE OF NATIVE CORONARY ARTERY W/OUT ANGINA PECTORIS: ICD-10-CM

## 2018-09-20 DIAGNOSIS — N18.3 CHRONIC KIDNEY DISEASE, STAGE 3 (MODERATE): ICD-10-CM

## 2018-09-20 DIAGNOSIS — E11.9 TYPE 2 DIABETES MELLITUS W/OUT COMPLICATIONS: ICD-10-CM

## 2018-10-01 VITALS
SYSTOLIC BLOOD PRESSURE: 164 MMHG | WEIGHT: 195.11 LBS | HEART RATE: 84 BPM | TEMPERATURE: 98 F | HEIGHT: 67 IN | OXYGEN SATURATION: 100 % | DIASTOLIC BLOOD PRESSURE: 88 MMHG

## 2018-10-01 NOTE — H&P ADULT - ATTENDING COMMENTS
Patient with known 3VD and LM disease - abn stress test  Was randomized to PCI in Hybrid trial protocol  Here today for staged PCI of LCx followed by iFR of LM to assess for need of further revasc.

## 2018-10-01 NOTE — H&P ADULT - RS GEN PE MLT RESP DETAILS PC
no rhonchi/respirations non-labored/normal/breath sounds equal/no rales/clear to auscultation bilaterally

## 2018-10-01 NOTE — H&P ADULT - HISTORY OF PRESENT ILLNESS
*SKELETON  59 y/o Male POOR HISTORIAN Current smoker with PMHx of Obesity, HTN, Hyperlipidemia, IDDM, CKD Stage 3 (Cr 1.4 09/13/18), Anemia (recently discharged from [facility] w/ H/H of 8.3/27.0 and not transfused, reportedly negative GI workup done ~ 2yrs ago), PVD s/p fem-pop bypass B/L, Hyperkalemia with ACE-I/ARB therapy, Chronic Diastolic  HF, Known CAD with prior PCI, most recently LEE pLAD @ [facility] on 07/27/18 with residual 50% dLM w/ MLA 5.1mm2 by IVUS, 90% OM1, 80% p/m/d RCA followed by cardiac cath in 9/18/18 receiving PTCA/LEE x2 mRCA and pRCA who now presents for staged intervention of known residual disease. Since his prior procedure, he reports ? He was discharged home on Aspirin and Brilinta. Echo performed 9/11/18 showed normal LV systolic function, no wall motion abnormalities, mild to moderate TR, mild MR, mild pulmonary pressures.    Patient now presents for recommended staged PCI of residual LCx lesion and recommended to return for staged procedure in 2-4 weeks.    Cardiac Cath @[facility] 9/18/18: LEE x 2 to midRCA 90% and proxRCA 90%; LMCA, LAD, and LCx were not engaged; patient to return for PCI of LCx lesion 59 y/o Male POOR HISTORIAN former smoker with PMHx of Obesity, HTN, Hyperlipidemia, IDDM, CKD Stage 3 (today Cr 1.38), Anemia (recently discharged from [facility] w/ H/H of 8.3/27.0 and not transfused, reportedly negative GI workup done ~ 2yrs ago), PVD s/p fem-pop bypass B/L, Hyperkalemia with ACE-I/ARB therapy, Chronic Diastolic  HF, Known CAD with prior PCI, most recently LEE pLAD @ [facility] on 07/27/18 with residual 50% dLM w/ MLA 5.1mm2 by IVUS, 90% OM1, 80% p/m/d RCA followed by cardiac cath in 9/18/18 receiving PTCA/LEE x2 mRCA and pRCA who now presents for staged intervention of known residual disease (previously discharged home with Aspirin and Brilinta). Since his prior procedure, he reports DALEY while walking 1/2 block and unable to walk a flight of stairs at a brisk pace with associated palpitations, LE swelling, dizziness and 2-pillow orthopnea. SOB resolved with rest. Pt denies CP, PND, fainting episodes, hematochezia, hematuria, hemorrhoids.    Of note, pt admits to "congestion of the throat" in the past few days. Denies sputum, hemoptysis, fever/chills and current sore throat.     Echo performed 9/11/18 showed normal LV systolic function, no wall motion abnormalities, mild to moderate TR, mild MR, mild pulmonary pressures.    NST performed 6/21/2018 showed EF 53%, moderate sized area of anterior apical and inferior wall ischemia, LV wall motion is normal.      Cardiac Cath @ [facility] 9/18/18: LEE x 2 to midRCA 90% and proxRCA 90%; LMCA, LAD, and LCx were not engaged; patient to return for PCI of LCx lesion  Patient is referred for a staged PCI for residual LCx lesion. 57 y/o former smoker Male POOR HISTORIAN with PMHx of Obesity, HTN, Hyperlipidemia, IDDM, CKD Stage 3 (today Cr 1.38), Anemia (recently discharged from [facility] w/ H/H of 8.3/27.0 and not transfused, reportedly negative GI workup done ~ 2yrs ago), PVD s/p fem-pop bypass B/L, Hyperkalemia with ACE-I/ARB therapy, Chronic Diastolic HF, Known CAD with prior PCI, most recently LEE pLAD @ [facility] on 07/27/18 with residual 50% dLM w/ MLA 5.1mm2 by IVUS, 90% OM1, 80% p/m/d RCA followed by cardiac cath in 9/18/18 receiving PTCA/LEE x2 mRCA and pRCA who now presents for staged intervention of known residual disease (previously discharged home with Aspirin and Brilinta). Since his prior procedure, he reports DALEY while walking 1/2 block and unable to walk a flight of stairs at a brisk pace with associated palpitations, LE swelling, dizziness and 2-pillow orthopnea. SOB resolved with rest. Pt denies CP, PND, fainting episodes, hematochezia, hematuria and hemorrhoids.    Of note, pt admits to "congestion of the throat" in the past few days. Denies sputum, hemoptysis, fever/chills and current sore throat.     Echo performed 9/11/18 showed normal LV systolic function, no wall motion abnormalities, mild to moderate TR, mild MR, mild pulmonary pressures.    NST performed 6/21/2018 showed EF 53%, moderate sized area of anterior apical and inferior wall ischemia, LV wall motion is normal.      Cardiac Cath @ [facility] 9/18/18: LEE x 2 to midRCA 90% and proxRCA 90%; LMCA, LAD, and LCx were not engaged; patient to return for PCI of LCx lesion  Patient is referred for a staged PCI for residual LCx lesion. 57 y/o former smoker Male POOR HISTORIAN with PMHx of Obesity, HTN, Hyperlipidemia, IDDM, CKD Stage 3 (Cr 1.38/GFR 56 today), Anemia (recently discharged from [facility] 09/2018 w/ H/H of 8.3/27.0 denies prior transfusions, negative GI workup done ~ 2yrs ago.  H/H= 10.7/36.3 today), PVD s/p fem-pop bypass B/L, Hyperkalemia with ACE-I/ARB therapy, Chronic Diastolic HF (EF of 53% by Stress 06/2018), Known CAD s/p LEE pLAD @ [facility] on 07/27/18 followed by LEE x 2 to mRCA and pRCA @ [facility] on 09/2018 who returns today for stage PCI of residual 90% OM1 lesion.  Since his prior procedure, pt reports compliance with his DAPT; ASA /Brilinta, however he reports DALEY  with increasing fatigue while walking 1/2 block and unable to walk a flight of stairs at a brisk pace with intermittent episodes of dizziness and palpitations.  Pt also has noticed B/L LE swelling and 2 pillow orthopnea.   Denies CP and syncope.  He initially presented  with similar symptoms and was subsequently referred for NST 6/21/2018 which demonstrated moderate sized area of anterior apical and inferior wall ischemia, LV wall motion is normal, EF 53%.  He ultimately was referred for elective cardiac cath with the above mentioned findings.  Repeat Echo performed 9/11/18 showed normal LV systolic function, no wall motion abnormalities, mild to moderate TR, mild MR, mild pulmonary pressures.    In light of pt’s risk factors, Known CAD, pt now returns for stage PCI of known residual OM1 lesion.            CATH HX:  Cardiac Cath @ [facility] on 07/27/18: 50% dLM with IVUS MLA 5.1mm2, 85% pLAD, 90% OM1, 80% pRCA, 80% mRCA, 80% dRCA, no LV gram performed.  Successful LEE pLAD     Cardiac Cath @ [facility] 9/18/18: LEE x 2 to midRCA 90% and proxRCA 90%; LMCA, LAD, and LCx were not engaged.  Patient to return for PCI of LCx lesion 57 y/o former smoker Male POOR HISTORIAN with PMHx of Obesity, HTN, Hyperlipidemia, IDDM, CKD Stage 3 (Cr 1.38/GFR 56 today), Anemia (recently discharged from [facility] 09/2018 w/ H/H of 8.3/27.0 denies prior transfusions, negative GI workup done ~ 2yrs ago.  H/H= 10.7/36.3 today), PVD s/p fem-pop bypass B/L, Hyperkalemia with ACE-I/ARB therapy, Chronic Diastolic HF (EF of 53% by Stress 06/2018), Known CAD s/p LEE pLAD @ [facility] on 07/27/18 followed by LEE x 2 to mRCA and pRCA @ [facility] on 09/2018 who returns today for stage PCI of residual 90% OM1 lesion.  Since his prior procedure, pt reports compliance with his DAPT; ASA /Brilinta, however he reports DALEY  with increasing fatigue while walking 1/2 block and unable to walk a flight of stairs at a brisk pace with intermittent episodes of dizziness and palpitations.  Pt also has noticed B/L LE swelling and 2 pillow orthopnea.   Denies CP and syncope.  He initially presented  with similar symptoms and was subsequently referred for NST 6/21/2018 which demonstrated moderate sized area of anterior apical and inferior wall ischemia, LV wall motion is normal, EF 53%.  He ultimately was referred for elective cardiac cath with the above mentioned findings.  Repeat Echo performed 9/11/18 showed normal LV systolic function, no wall motion abnormalities, mild to moderate TR, mild MR, mild pulmonary pressures.    In light of pt’s risk factors and known CAD in the setting of CCS Class III equivalent sxs, pt now returns for stage PCI of known residual OM1 lesion.        CATH HX:  Cardiac Cath @ [facility] on 07/27/18: 50% dLM with IVUS MLA 5.1mm2, 85% pLAD, 90% OM1, 80% pRCA, 80% mRCA, 80% dRCA, no LV gram performed.  Successful LEE pLAD     Cardiac Cath @ [facility] 9/18/18: LEE x 2 to midRCA 90% and proxRCA 90%; LMCA, LAD, and LCx were not engaged.  Patient to return for PCI of LCx lesion

## 2018-10-01 NOTE — H&P ADULT - ASSESSMENT
59 y/o former smoker Male POOR HISTORIAN with PMHx of Obesity, HTN, Hyperlipidemia, IDDM, CKD Stage 3 (today Cr 1.38), Anemia (recently discharged from [facility] w/ H/H of 8.3/27.0 and not transfused, reportedly negative GI workup done ~ 2yrs ago), PVD s/p fem-pop bypass B/L, Hyperkalemia with ACE-I/ARB therapy, Chronic Diastolic HF, Known CAD with prior PCI, most recently LEE pLAD @ [facility] on 07/27/18 with residual 50% dLM w/ MLA 5.1mm2 by IVUS, 90% OM1, 80% p/m/d RCA followed by cardiac cath in 9/18/18 receiving PTCA/LEE x2 mRCA and pRCA who presents for staged PCI of known residual disease.     ASA III   Mallampati III    Risks & benefits of procedure and alternative therapy have been explained to the patient including but not limited to: allergic reaction, bleeding w/possible need for blood transfusion, infection, renal and vascular compromise, limb damage, arrhythmia, stroke, vessel dissection/perforation, Myocardial infarction, emergent CABG. Informed consent obtained and in chart.    Precath/ Consented  - As d/w Dr. De La Rosa, pt is within normal baseline of his creatinine and GFR. Today's Crt 1.38 (baseline from 09/2018, Crt 1.17) and GFR 5.6 (Baseline from 09/2018, GFR 73). IVF Hydration with NS @ 100 cc/hr.  - Moderate Insulin sliding scale ordered. Serum glucose 356. Finger stick >350. Pt encouraged to F/U with PCP (Dr. Ann) as pt is self-aware that his glucose is "always high."  - Pt takes Aspirin 81mg OD and Brilinta 90mg BID at home and claims to be compliant with his medications, but didn't take them this morning. Pt is loaded with ASA 81mg PO x 1 and Brilinta 90mg PO x 1 pre-procedure. 59 y/o former smoker Male POOR HISTORIAN with PMHx of Obesity, HTN, Hyperlipidemia, IDDM, CKD Stage 3 (today Cr 1.38), Anemia (recently discharged from [facility] w/ H/H of 8.3/27.0 and not transfused, reportedly negative GI workup done ~ 2yrs ago), PVD s/p fem-pop bypass B/L, Hyperkalemia with ACE-I/ARB therapy, Chronic Diastolic HF, Known CAD with prior PCI, most recently LEE pLAD @ [facility] on 07/27/18 with residual 50% dLM w/ MLA 5.1mm2 by IVUS, 90% OM1, 80% p/m/d RCA followed by cardiac cath in 9/18/18 receiving PTCA/LEE x2 mRCA and pRCA who presents for staged PCI of known residual disease.     ASA III   Mallampati III    Risks & benefits of procedure and alternative therapy have been explained to the patient including but not limited to: allergic reaction, bleeding w/possible need for blood transfusion, infection, renal and vascular compromise, limb damage, arrhythmia, stroke, vessel dissection/perforation, Myocardial infarction, emergent CABG. Informed consent obtained and in chart.    Precath/ Consented  - CKD Stage 3: As d/w Dr. De La Rosa, pt is within normal baseline of his creatinine and GFR. Today's Crt 1.38 (baseline from 09/2018, Crt 1.17) and GFR 5.6 (baseline from 09/2018, GFR 73). IVF Hydration with NS @ 100 cc/hr. Pt presents as euvolemic on exam.  - Pt takes Aspirin 81mg OD and Brilinta 90mg BID at home and claims to be compliant with his medications, but didn't take them this AM. Pt is loaded with ASA 81mg PO x 1 and Brilinta 90mg PO x 1 pre-procedure.  - IDDM: Moderate Insulin sliding scale ordered. Serum glucose 355, Finger stick 356 and Hemoglobin A1C 9.6. Pt encouraged to F/U with PCP (Dr. Ann) as pt is self-aware that his glucose is "always high" and often feels "numbness in his toes." Educated pt teresa DM drug compliance, diabetic foot and diet.  - Known Anemia: Today's H/H is 10.7/36.3 (baseline from 09/2018, H/H 8.9/31.5). Pt denies hx of hemorrhoids, hematuria and hematochezia. 59 y/o former smoker Male POOR HISTORIAN with PMHx of Obesity, HTN, Hyperlipidemia, IDDM, CKD Stage 3 (Cr 1.38/GFR 56 today), Anemia (recently discharged from [facility] 09/2018 w/ H/H of 8.3/27.0 denies prior transfusions, negative GI workup done ~ 2yrs ago.  H/H= 10.7/36.3 today), PVD s/p fem-pop bypass B/L, Hyperkalemia with ACE-I/ARB therapy, Chronic Diastolic HF (EF of 53% by Stress 06/2018), Known CAD s/p LEE pLAD @ [facility] on 07/27/18 followed by LEE x 2 to mRCA and pRCA @ [facility] on 09/2018 who returns for stage PCI of Known residual OM1 lesion.     ASA III   Mallampati III    Risks & benefits of procedure and alternative therapy have been explained to the patient including but not limited to: allergic reaction, bleeding w/possible need for blood transfusion, infection, renal and vascular compromise, limb damage, arrhythmia, stroke, vessel dissection/perforation, Myocardial infarction, emergent CABG. Informed consent obtained and in chart.    Precath/ Consented  - CKD Stage 3 w/ Cr 1.38/GFR 56 today, within pt's baseline in the setting of chronic diastolic HF (EF 53% by stress 06/2018).  Euvolemic on exam.  Will initiate IVF Hydration: NS @ 100 cc/hr.   - Pt takes Aspirin 81mg OD and Brilinta 90mg BID at home and claims to be compliant with his medications, but didn't take them this AM. Pt received his daily dose of ASA 81mg PO x 1 and Brilinta 90mg PO x 1 pre-procedure.  - IDDM: Moderate Insulin sliding scale ordered. Serum glucose 355, Finger stick 356 and Hemoglobin A1C 9.6. Pt encouraged to F/U with PCP (Dr. Ann) as pt is self-aware that his glucose is "always high" and often feels "numbness in his toes." Educated pt on DM drug compliance, diabetic foot and diet.  - Known Anemia: Today's H/H is 10.7/36.3 (baseline from 09/2018, H/H 8.9/31.5). Reportedly GI workup ~ 2 yrs ago was negative.  Pt denies hx of hemorrhoids, hematuria, hematochezia, and transfusions.

## 2018-10-01 NOTE — H&P ADULT - NSHPSOCIALHISTORY_GEN_ALL_CORE
Former smoker, recently quit last June 2018. Previously averaged 1 PPD for over 40 years. Social ETOH drinker. Denies illicit drug use.

## 2018-10-02 ENCOUNTER — INPATIENT (INPATIENT)
Facility: HOSPITAL | Age: 58
LOS: 0 days | Discharge: HOME | DRG: 247 | End: 2018-10-03
Attending: INTERNAL MEDICINE | Admitting: INTERNAL MEDICINE
Payer: COMMERCIAL

## 2018-10-02 DIAGNOSIS — Z95.828 PRESENCE OF OTHER VASCULAR IMPLANTS AND GRAFTS: Chronic | ICD-10-CM

## 2018-10-02 DIAGNOSIS — Z90.49 ACQUIRED ABSENCE OF OTHER SPECIFIED PARTS OF DIGESTIVE TRACT: Chronic | ICD-10-CM

## 2018-10-02 LAB
ALBUMIN SERPL ELPH-MCNC: 4.4 G/DL — SIGNIFICANT CHANGE UP (ref 3.3–5)
ALP SERPL-CCNC: 140 U/L — HIGH (ref 40–120)
ALT FLD-CCNC: 27 U/L — SIGNIFICANT CHANGE UP (ref 10–45)
ANION GAP SERPL CALC-SCNC: 13 MMOL/L — SIGNIFICANT CHANGE UP (ref 5–17)
APTT BLD: 31.7 SEC — SIGNIFICANT CHANGE UP (ref 27.5–37.4)
AST SERPL-CCNC: 28 U/L — SIGNIFICANT CHANGE UP (ref 10–40)
BASOPHILS NFR BLD AUTO: 0.5 % — SIGNIFICANT CHANGE UP (ref 0–2)
BILIRUB SERPL-MCNC: 0.4 MG/DL — SIGNIFICANT CHANGE UP (ref 0.2–1.2)
BUN SERPL-MCNC: 23 MG/DL — SIGNIFICANT CHANGE UP (ref 7–23)
CALCIUM SERPL-MCNC: 9.8 MG/DL — SIGNIFICANT CHANGE UP (ref 8.4–10.5)
CHLORIDE SERPL-SCNC: 92 MMOL/L — LOW (ref 96–108)
CHOLEST SERPL-MCNC: 139 MG/DL — SIGNIFICANT CHANGE UP (ref 10–199)
CK MB CFR SERPL CALC: 3.3 NG/ML — SIGNIFICANT CHANGE UP (ref 0–6.7)
CK SERPL-CCNC: 214 U/L — HIGH (ref 30–200)
CO2 SERPL-SCNC: 32 MMOL/L — HIGH (ref 22–31)
CREAT SERPL-MCNC: 1.38 MG/DL — HIGH (ref 0.5–1.3)
CRP SERPL-MCNC: 0.48 MG/DL — HIGH (ref 0–0.4)
EOSINOPHIL NFR BLD AUTO: 2 % — SIGNIFICANT CHANGE UP (ref 0–6)
GLUCOSE BLDC GLUCOMTR-MCNC: 253 MG/DL — HIGH (ref 70–99)
GLUCOSE BLDC GLUCOMTR-MCNC: 308 MG/DL — HIGH (ref 70–99)
GLUCOSE BLDC GLUCOMTR-MCNC: 310 MG/DL — HIGH (ref 70–99)
GLUCOSE BLDC GLUCOMTR-MCNC: 356 MG/DL — HIGH (ref 70–99)
GLUCOSE SERPL-MCNC: 355 MG/DL — HIGH (ref 70–99)
HBA1C BLD-MCNC: 9.6 % — HIGH (ref 4–5.6)
HCT VFR BLD CALC: 36.3 % — LOW (ref 39–50)
HDLC SERPL-MCNC: 47 MG/DL — SIGNIFICANT CHANGE UP
HGB BLD-MCNC: 10.7 G/DL — LOW (ref 13–17)
INR BLD: 1.04 — SIGNIFICANT CHANGE UP (ref 0.88–1.16)
LIPID PNL WITH DIRECT LDL SERPL: 64 MG/DL — SIGNIFICANT CHANGE UP
LYMPHOCYTES # BLD AUTO: 23.1 % — SIGNIFICANT CHANGE UP (ref 13–44)
MCHC RBC-ENTMCNC: 20 PG — LOW (ref 27–34)
MCHC RBC-ENTMCNC: 29.5 G/DL — LOW (ref 32–36)
MCV RBC AUTO: 67.7 FL — LOW (ref 80–100)
MONOCYTES NFR BLD AUTO: 9.1 % — SIGNIFICANT CHANGE UP (ref 2–14)
NEUTROPHILS NFR BLD AUTO: 65.3 % — SIGNIFICANT CHANGE UP (ref 43–77)
PLATELET # BLD AUTO: 275 K/UL — SIGNIFICANT CHANGE UP (ref 150–400)
POTASSIUM SERPL-MCNC: 3.7 MMOL/L — SIGNIFICANT CHANGE UP (ref 3.5–5.3)
POTASSIUM SERPL-SCNC: 3.7 MMOL/L — SIGNIFICANT CHANGE UP (ref 3.5–5.3)
PROT SERPL-MCNC: 8.8 G/DL — HIGH (ref 6–8.3)
PROTHROM AB SERPL-ACNC: 11.6 SEC — SIGNIFICANT CHANGE UP (ref 9.8–12.7)
RBC # BLD: 5.36 M/UL — SIGNIFICANT CHANGE UP (ref 4.2–5.8)
RBC # FLD: 16.3 % — SIGNIFICANT CHANGE UP (ref 10.3–16.9)
SODIUM SERPL-SCNC: 137 MMOL/L — SIGNIFICANT CHANGE UP (ref 135–145)
TOTAL CHOLESTEROL/HDL RATIO MEASUREMENT: 3 RATIO — LOW (ref 3.4–9.6)
TRIGL SERPL-MCNC: 142 MG/DL — SIGNIFICANT CHANGE UP (ref 10–149)
WBC # BLD: 10.8 K/UL — HIGH (ref 3.8–10.5)
WBC # FLD AUTO: 10.8 K/UL — HIGH (ref 3.8–10.5)

## 2018-10-02 PROCEDURE — 93571 IV DOP VEL&/PRESS C FLO 1ST: CPT | Mod: 26

## 2018-10-02 PROCEDURE — 92928 PRQ TCAT PLMT NTRAC ST 1 LES: CPT | Mod: 26,LC

## 2018-10-02 PROCEDURE — 93454 CORONARY ARTERY ANGIO S&I: CPT | Mod: 26,XU

## 2018-10-02 PROCEDURE — 93010 ELECTROCARDIOGRAM REPORT: CPT

## 2018-10-02 PROCEDURE — 92978 ENDOLUMINL IVUS OCT C 1ST: CPT | Mod: 26

## 2018-10-02 PROCEDURE — 93572 IV DOP VEL&/PRESS C FLO EA: CPT | Mod: 26

## 2018-10-02 PROCEDURE — 92979 ENDOLUMINL IVUS OCT C EA: CPT | Mod: 26

## 2018-10-02 PROCEDURE — 76937 US GUIDE VASCULAR ACCESS: CPT | Mod: 26

## 2018-10-02 RX ORDER — HYDRALAZINE HCL 50 MG
50 TABLET ORAL EVERY 8 HOURS
Qty: 0 | Refills: 0 | Status: DISCONTINUED | OUTPATIENT
Start: 2018-10-02 | End: 2018-10-03

## 2018-10-02 RX ORDER — ASPIRIN/CALCIUM CARB/MAGNESIUM 324 MG
1 TABLET ORAL
Qty: 0 | Refills: 0 | COMMUNITY

## 2018-10-02 RX ORDER — DEXTROSE 50 % IN WATER 50 %
25 SYRINGE (ML) INTRAVENOUS ONCE
Qty: 0 | Refills: 0 | Status: DISCONTINUED | OUTPATIENT
Start: 2018-10-02 | End: 2018-10-03

## 2018-10-02 RX ORDER — INSULIN LISPRO 100/ML
VIAL (ML) SUBCUTANEOUS
Qty: 0 | Refills: 0 | Status: DISCONTINUED | OUTPATIENT
Start: 2018-10-02 | End: 2018-10-03

## 2018-10-02 RX ORDER — FOLIC ACID 0.8 MG
1 TABLET ORAL
Qty: 0 | Refills: 0 | COMMUNITY

## 2018-10-02 RX ORDER — OMEPRAZOLE 10 MG/1
1 CAPSULE, DELAYED RELEASE ORAL
Qty: 0 | Refills: 0 | COMMUNITY

## 2018-10-02 RX ORDER — DEXTROSE 50 % IN WATER 50 %
15 SYRINGE (ML) INTRAVENOUS ONCE
Qty: 0 | Refills: 0 | Status: DISCONTINUED | OUTPATIENT
Start: 2018-10-02 | End: 2018-10-03

## 2018-10-02 RX ORDER — DEXTROSE 50 % IN WATER 50 %
12.5 SYRINGE (ML) INTRAVENOUS ONCE
Qty: 0 | Refills: 0 | Status: DISCONTINUED | OUTPATIENT
Start: 2018-10-02 | End: 2018-10-03

## 2018-10-02 RX ORDER — GABAPENTIN 400 MG/1
300 CAPSULE ORAL THREE TIMES A DAY
Qty: 0 | Refills: 0 | Status: DISCONTINUED | OUTPATIENT
Start: 2018-10-02 | End: 2018-10-03

## 2018-10-02 RX ORDER — SODIUM CHLORIDE 9 MG/ML
1000 INJECTION, SOLUTION INTRAVENOUS
Qty: 0 | Refills: 0 | Status: DISCONTINUED | OUTPATIENT
Start: 2018-10-02 | End: 2018-10-03

## 2018-10-02 RX ORDER — VITAMIN E 100 UNIT
1 CAPSULE ORAL
Qty: 0 | Refills: 0 | COMMUNITY

## 2018-10-02 RX ORDER — METOPROLOL TARTRATE 50 MG
3 TABLET ORAL
Qty: 0 | Refills: 0 | COMMUNITY

## 2018-10-02 RX ORDER — BUDESONIDE AND FORMOTEROL FUMARATE DIHYDRATE 160; 4.5 UG/1; UG/1
2 AEROSOL RESPIRATORY (INHALATION)
Qty: 0 | Refills: 0 | COMMUNITY

## 2018-10-02 RX ORDER — SODIUM CHLORIDE 9 MG/ML
500 INJECTION INTRAMUSCULAR; INTRAVENOUS; SUBCUTANEOUS
Qty: 0 | Refills: 0 | Status: DISCONTINUED | OUTPATIENT
Start: 2018-10-02 | End: 2018-10-02

## 2018-10-02 RX ORDER — FUROSEMIDE 40 MG
0 TABLET ORAL
Qty: 0 | Refills: 0 | COMMUNITY

## 2018-10-02 RX ORDER — SENNA PLUS 8.6 MG/1
1 TABLET ORAL AT BEDTIME
Qty: 0 | Refills: 0 | Status: DISCONTINUED | OUTPATIENT
Start: 2018-10-02 | End: 2018-10-03

## 2018-10-02 RX ORDER — HYDRALAZINE HCL 50 MG
0 TABLET ORAL
Qty: 0 | Refills: 0 | COMMUNITY

## 2018-10-02 RX ORDER — METOPROLOL TARTRATE 50 MG
75 TABLET ORAL DAILY
Qty: 0 | Refills: 0 | Status: DISCONTINUED | OUTPATIENT
Start: 2018-10-02 | End: 2018-10-03

## 2018-10-02 RX ORDER — SODIUM CHLORIDE 9 MG/ML
500 INJECTION INTRAMUSCULAR; INTRAVENOUS; SUBCUTANEOUS
Qty: 0 | Refills: 0 | Status: DISCONTINUED | OUTPATIENT
Start: 2018-10-02 | End: 2018-10-03

## 2018-10-02 RX ORDER — SENNA PLUS 8.6 MG/1
2 TABLET ORAL
Qty: 0 | Refills: 0 | COMMUNITY

## 2018-10-02 RX ORDER — GLUCAGON INJECTION, SOLUTION 0.5 MG/.1ML
1 INJECTION, SOLUTION SUBCUTANEOUS ONCE
Qty: 0 | Refills: 0 | Status: DISCONTINUED | OUTPATIENT
Start: 2018-10-02 | End: 2018-10-03

## 2018-10-02 RX ORDER — ASPIRIN/CALCIUM CARB/MAGNESIUM 324 MG
81 TABLET ORAL DAILY
Qty: 0 | Refills: 0 | Status: DISCONTINUED | OUTPATIENT
Start: 2018-10-03 | End: 2018-10-03

## 2018-10-02 RX ORDER — ALBUTEROL 90 UG/1
1.25 AEROSOL, METERED ORAL EVERY 6 HOURS
Qty: 0 | Refills: 0 | Status: DISCONTINUED | OUTPATIENT
Start: 2018-10-02 | End: 2018-10-03

## 2018-10-02 RX ORDER — TICAGRELOR 90 MG/1
90 TABLET ORAL ONCE
Qty: 0 | Refills: 0 | Status: COMPLETED | OUTPATIENT
Start: 2018-10-02 | End: 2018-10-02

## 2018-10-02 RX ORDER — ALBUTEROL 90 UG/1
3 AEROSOL, METERED ORAL
Qty: 0 | Refills: 0 | COMMUNITY

## 2018-10-02 RX ORDER — ATORVASTATIN CALCIUM 80 MG/1
1 TABLET, FILM COATED ORAL
Qty: 0 | Refills: 0 | COMMUNITY

## 2018-10-02 RX ORDER — INSULIN GLARGINE 100 [IU]/ML
14 INJECTION, SOLUTION SUBCUTANEOUS AT BEDTIME
Qty: 0 | Refills: 0 | Status: DISCONTINUED | OUTPATIENT
Start: 2018-10-02 | End: 2018-10-03

## 2018-10-02 RX ORDER — BUDESONIDE AND FORMOTEROL FUMARATE DIHYDRATE 160; 4.5 UG/1; UG/1
2 AEROSOL RESPIRATORY (INHALATION)
Qty: 0 | Refills: 0 | Status: DISCONTINUED | OUTPATIENT
Start: 2018-10-02 | End: 2018-10-03

## 2018-10-02 RX ORDER — GLIMEPIRIDE 1 MG
1 TABLET ORAL
Qty: 0 | Refills: 0 | COMMUNITY

## 2018-10-02 RX ORDER — DOCUSATE SODIUM 100 MG
0 CAPSULE ORAL
Qty: 0 | Refills: 0 | COMMUNITY

## 2018-10-02 RX ORDER — ISOSORBIDE MONONITRATE 60 MG/1
1 TABLET, EXTENDED RELEASE ORAL
Qty: 0 | Refills: 0 | COMMUNITY

## 2018-10-02 RX ORDER — TICAGRELOR 90 MG/1
1 TABLET ORAL
Qty: 0 | Refills: 0 | COMMUNITY

## 2018-10-02 RX ORDER — ASPIRIN/CALCIUM CARB/MAGNESIUM 324 MG
81 TABLET ORAL ONCE
Qty: 0 | Refills: 0 | Status: COMPLETED | OUTPATIENT
Start: 2018-10-02 | End: 2018-10-02

## 2018-10-02 RX ORDER — CHLORHEXIDINE GLUCONATE 213 G/1000ML
1 SOLUTION TOPICAL ONCE
Qty: 0 | Refills: 0 | Status: DISCONTINUED | OUTPATIENT
Start: 2018-10-02 | End: 2018-10-02

## 2018-10-02 RX ORDER — INSULIN LISPRO 100/ML
VIAL (ML) SUBCUTANEOUS ONCE
Qty: 0 | Refills: 0 | Status: COMPLETED | OUTPATIENT
Start: 2018-10-02 | End: 2018-10-02

## 2018-10-02 RX ORDER — ISOSORBIDE MONONITRATE 60 MG/1
60 TABLET, EXTENDED RELEASE ORAL DAILY
Qty: 0 | Refills: 0 | Status: DISCONTINUED | OUTPATIENT
Start: 2018-10-02 | End: 2018-10-03

## 2018-10-02 RX ORDER — TICAGRELOR 90 MG/1
90 TABLET ORAL
Qty: 0 | Refills: 0 | Status: DISCONTINUED | OUTPATIENT
Start: 2018-10-02 | End: 2018-10-03

## 2018-10-02 RX ORDER — DOCUSATE SODIUM 100 MG
100 CAPSULE ORAL THREE TIMES A DAY
Qty: 0 | Refills: 0 | Status: DISCONTINUED | OUTPATIENT
Start: 2018-10-02 | End: 2018-10-03

## 2018-10-02 RX ORDER — FOLIC ACID 0.8 MG
1 TABLET ORAL DAILY
Qty: 0 | Refills: 0 | Status: DISCONTINUED | OUTPATIENT
Start: 2018-10-02 | End: 2018-10-03

## 2018-10-02 RX ORDER — INSULIN GLARGINE 100 [IU]/ML
14 INJECTION, SOLUTION SUBCUTANEOUS
Qty: 0 | Refills: 0 | COMMUNITY

## 2018-10-02 RX ORDER — PANTOPRAZOLE SODIUM 20 MG/1
40 TABLET, DELAYED RELEASE ORAL
Qty: 0 | Refills: 0 | Status: DISCONTINUED | OUTPATIENT
Start: 2018-10-02 | End: 2018-10-03

## 2018-10-02 RX ORDER — GABAPENTIN 400 MG/1
1 CAPSULE ORAL
Qty: 0 | Refills: 0 | COMMUNITY

## 2018-10-02 RX ORDER — ATORVASTATIN CALCIUM 80 MG/1
40 TABLET, FILM COATED ORAL AT BEDTIME
Qty: 0 | Refills: 0 | Status: DISCONTINUED | OUTPATIENT
Start: 2018-10-02 | End: 2018-10-03

## 2018-10-02 RX ADMIN — ATORVASTATIN CALCIUM 40 MILLIGRAM(S): 80 TABLET, FILM COATED ORAL at 21:33

## 2018-10-02 RX ADMIN — BUDESONIDE AND FORMOTEROL FUMARATE DIHYDRATE 2 PUFF(S): 160; 4.5 AEROSOL RESPIRATORY (INHALATION) at 19:11

## 2018-10-02 RX ADMIN — Medication 8: at 21:33

## 2018-10-02 RX ADMIN — SENNA PLUS 1 TABLET(S): 8.6 TABLET ORAL at 21:33

## 2018-10-02 RX ADMIN — PANTOPRAZOLE SODIUM 40 MILLIGRAM(S): 20 TABLET, DELAYED RELEASE ORAL at 19:12

## 2018-10-02 RX ADMIN — GABAPENTIN 300 MILLIGRAM(S): 400 CAPSULE ORAL at 21:33

## 2018-10-02 RX ADMIN — Medication 8: at 19:12

## 2018-10-02 RX ADMIN — SODIUM CHLORIDE 100 MILLILITER(S): 9 INJECTION INTRAMUSCULAR; INTRAVENOUS; SUBCUTANEOUS at 09:35

## 2018-10-02 RX ADMIN — Medication 75 MILLIGRAM(S): at 14:35

## 2018-10-02 RX ADMIN — Medication 1 MILLIGRAM(S): at 19:12

## 2018-10-02 RX ADMIN — Medication: at 12:58

## 2018-10-02 RX ADMIN — Medication 10: at 09:51

## 2018-10-02 RX ADMIN — Medication 50 MILLIGRAM(S): at 14:35

## 2018-10-02 RX ADMIN — Medication 100 MILLIGRAM(S): at 21:33

## 2018-10-02 RX ADMIN — Medication 81 MILLIGRAM(S): at 09:51

## 2018-10-02 RX ADMIN — INSULIN GLARGINE 14 UNIT(S): 100 INJECTION, SOLUTION SUBCUTANEOUS at 21:33

## 2018-10-02 RX ADMIN — Medication 50 MILLIGRAM(S): at 21:33

## 2018-10-02 RX ADMIN — ISOSORBIDE MONONITRATE 60 MILLIGRAM(S): 60 TABLET, EXTENDED RELEASE ORAL at 14:35

## 2018-10-02 RX ADMIN — TICAGRELOR 90 MILLIGRAM(S): 90 TABLET ORAL at 09:51

## 2018-10-02 RX ADMIN — TICAGRELOR 90 MILLIGRAM(S): 90 TABLET ORAL at 19:12

## 2018-10-03 ENCOUNTER — TRANSCRIPTION ENCOUNTER (OUTPATIENT)
Age: 58
End: 2018-10-03

## 2018-10-03 VITALS — WEIGHT: 85.1 LBS

## 2018-10-03 LAB
ANION GAP SERPL CALC-SCNC: 16 MMOL/L — SIGNIFICANT CHANGE UP (ref 5–17)
BASOPHILS NFR BLD AUTO: 0.3 % — SIGNIFICANT CHANGE UP (ref 0–2)
BUN SERPL-MCNC: 23 MG/DL — SIGNIFICANT CHANGE UP (ref 7–23)
CALCIUM SERPL-MCNC: 9.3 MG/DL — SIGNIFICANT CHANGE UP (ref 8.4–10.5)
CHLORIDE SERPL-SCNC: 99 MMOL/L — SIGNIFICANT CHANGE UP (ref 96–108)
CO2 SERPL-SCNC: 25 MMOL/L — SIGNIFICANT CHANGE UP (ref 22–31)
CREAT SERPL-MCNC: 1.22 MG/DL — SIGNIFICANT CHANGE UP (ref 0.5–1.3)
EOSINOPHIL NFR BLD AUTO: 1.4 % — SIGNIFICANT CHANGE UP (ref 0–6)
GLUCOSE BLDC GLUCOMTR-MCNC: 281 MG/DL — HIGH (ref 70–99)
GLUCOSE BLDC GLUCOMTR-MCNC: 291 MG/DL — HIGH (ref 70–99)
GLUCOSE BLDC GLUCOMTR-MCNC: 398 MG/DL — HIGH (ref 70–99)
GLUCOSE BLDC GLUCOMTR-MCNC: 415 MG/DL — HIGH (ref 70–99)
GLUCOSE SERPL-MCNC: 307 MG/DL — HIGH (ref 70–99)
HCT VFR BLD CALC: 34.9 % — LOW (ref 39–50)
HGB BLD-MCNC: 10 G/DL — LOW (ref 13–17)
LYMPHOCYTES # BLD AUTO: 21.5 % — SIGNIFICANT CHANGE UP (ref 13–44)
MAGNESIUM SERPL-MCNC: 2.4 MG/DL — SIGNIFICANT CHANGE UP (ref 1.6–2.6)
MCHC RBC-ENTMCNC: 19.6 PG — LOW (ref 27–34)
MCHC RBC-ENTMCNC: 28.7 G/DL — LOW (ref 32–36)
MCV RBC AUTO: 68.6 FL — LOW (ref 80–100)
MONOCYTES NFR BLD AUTO: 7.7 % — SIGNIFICANT CHANGE UP (ref 2–14)
NEUTROPHILS NFR BLD AUTO: 69.1 % — SIGNIFICANT CHANGE UP (ref 43–77)
PLATELET # BLD AUTO: 300 K/UL — SIGNIFICANT CHANGE UP (ref 150–400)
POTASSIUM SERPL-MCNC: 3.9 MMOL/L — SIGNIFICANT CHANGE UP (ref 3.5–5.3)
POTASSIUM SERPL-SCNC: 3.9 MMOL/L — SIGNIFICANT CHANGE UP (ref 3.5–5.3)
RBC # BLD: 5.09 M/UL — SIGNIFICANT CHANGE UP (ref 4.2–5.8)
RBC # FLD: 16.4 % — SIGNIFICANT CHANGE UP (ref 10.3–16.9)
SODIUM SERPL-SCNC: 140 MMOL/L — SIGNIFICANT CHANGE UP (ref 135–145)
WBC # BLD: 15.8 K/UL — HIGH (ref 3.8–10.5)
WBC # FLD AUTO: 15.8 K/UL — HIGH (ref 3.8–10.5)

## 2018-10-03 PROCEDURE — 99231 SBSQ HOSP IP/OBS SF/LOW 25: CPT | Mod: 25

## 2018-10-03 PROCEDURE — 93010 ELECTROCARDIOGRAM REPORT: CPT

## 2018-10-03 RX ADMIN — PANTOPRAZOLE SODIUM 40 MILLIGRAM(S): 20 TABLET, DELAYED RELEASE ORAL at 05:38

## 2018-10-03 RX ADMIN — ISOSORBIDE MONONITRATE 60 MILLIGRAM(S): 60 TABLET, EXTENDED RELEASE ORAL at 11:38

## 2018-10-03 RX ADMIN — Medication 10: at 11:37

## 2018-10-03 RX ADMIN — Medication 6: at 07:21

## 2018-10-03 RX ADMIN — Medication 1 MILLIGRAM(S): at 11:38

## 2018-10-03 RX ADMIN — Medication 75 MILLIGRAM(S): at 05:38

## 2018-10-03 RX ADMIN — Medication 100 MILLIGRAM(S): at 05:38

## 2018-10-03 RX ADMIN — BUDESONIDE AND FORMOTEROL FUMARATE DIHYDRATE 2 PUFF(S): 160; 4.5 AEROSOL RESPIRATORY (INHALATION) at 07:22

## 2018-10-03 RX ADMIN — Medication 50 MILLIGRAM(S): at 05:38

## 2018-10-03 RX ADMIN — GABAPENTIN 300 MILLIGRAM(S): 400 CAPSULE ORAL at 05:38

## 2018-10-03 RX ADMIN — Medication 81 MILLIGRAM(S): at 11:38

## 2018-10-03 RX ADMIN — TICAGRELOR 90 MILLIGRAM(S): 90 TABLET ORAL at 05:38

## 2018-10-03 NOTE — DISCHARGE NOTE ADULT - CARE PROVIDER_API CALL
Gary De La Rosa), Cardiovascular Disease; Internal Medicine  [address] [place] [address]  Phone: [phone]  Fax: [phone]    Lucía Castillo), Cardiovascular Disease; Internal Medicine; Nuclear Cardiology  [address] [address]  Phone: [phone]  Fax: [phone]

## 2018-10-03 NOTE — DISCHARGE NOTE ADULT - CARE PLAN
Principal Discharge DX:	Coronary artery disease of native artery of native heart with stable angina pectoris  Goal:	Take your ASPIRIN AND BRILINTA  Assessment and plan of treatment:	You have a diagnosis of coronary artery disease and  received two Drug Eluting stent to your mid Left Circumflex coronary artery.  You MUST continue taking the daily Aspirin and Brilinta (Ticagrelor) to ensure your stent does not close. DO NOT STOP THESE MEDICATIONS FOR ANY REASON UNLESS OTHERWISE INDICATED BY YOUR CARDIOLOGIST BECAUSE THIS WILL PUT YOU AT RISK FOR A HEART ATTACK. You should refrain from strenuous activity and heavy lifting for 1 week. Please make a follow up appointment with your cardiologist Dr Gary De La Rosa within 1 week of your discharge and also have your creatine level checked.  You have all your prescriptions with adequate refills at home.       The catheter from your wrist was removed and bleeding was stopped by manual pressure.  Wash the site daily with soap and water.  There is no need to put on a bandage.      Call the Interventional Cardiology and Vascular Team at [phone] or [phone] if any of following occur pertaining to your vascular access site:  Bleeding or hematoma formation (collection of blood under the skin), drainage or redness at the puncture site, numbness, decrease in strength, coolness or pale coloration of skin of the leg or hand.  Secondary Diagnosis:	Diabetes  Assessment and plan of treatment:	You have diabetes and your HgbA1C is elevated at 9.6 as well as your fingerstick blood glucose levels.  Resume your Insulin at nighttime and your glimepiride.  It is also improtant for you to avoid any high carbohydrate or sugary foods.  Increasing your level of activity will help with weight loss.    *You will need to follow up with your PMD/Endocrinologist for ongoing Diabetes management.  Given some of your other risk factors, It is also recommended you see someone for prevention; At [facility] our prevention Doctor Is Doctor Lucía Allen.  You may contact her office for appointement.    -If you do not have an endocrinologist, you can contact our Endocrinology clinic for appointment to establish care.  They are located at [address] [place] [address] .  [phone]  Secondary Diagnosis:	Essential hypertension  Assessment and plan of treatment:	Continue taking your home blood pressure medicines  Secondary Diagnosis:	Hyperlipidemia  Assessment and plan of treatment:	Continue taking Atorvastatin.  Secondary Diagnosis:	PVD (peripheral vascular disease)  Assessment and plan of treatment:	You have peripheral artery disease. continue walking as tolerated, taking your Aspirin and plavix, and follow up with your Cardiologist or vascular surgeon.  Secondary Diagnosis:	Chronic diastolic CHF (congestive heart failure)  Assessment and plan of treatment:	You have a history of heart failure and should continue your daily lasix. You should weigh yourself daily and if you notice a weight gain of more than 2-3 pounds in 2 days, shortness of breath, or lower extremity swelling you should contact your doctor immediately. Please restrict your fluid intake to 1-2L daily.

## 2018-10-03 NOTE — DISCHARGE NOTE ADULT - SECONDARY DIAGNOSIS.
Diabetes Essential hypertension Hyperlipidemia PVD (peripheral vascular disease) Chronic diastolic CHF (congestive heart failure)

## 2018-10-03 NOTE — PROGRESS NOTE ADULT - SUBJECTIVE AND OBJECTIVE BOX
INTERVENTIONAL CARDIOLOGY FOLLOW UP NOTE    -Patient seen and examined this am    -No events overnight    -No complaints this am    VASCULAR ACCESS EXAM:    RADIAL:    2+ right/left radial pulse    Normal capillary refill. No evidence of motor or sensory deficits of digits, hand, wrist or forearm.    -Left Radial Access site clean, non-tender, without ecchymosis or hematoma.    A/P: 57 y/o former smoker Male POOR HISTORIAN with PMHx of Obesity, HTN, Hyperlipidemia, IDDM, CKD Stage 3 (Cr 1.38/GFR 56 today), Anemia (recently discharged from [facility] 09/2018 w/ H/H of 8.3/27.0 denies prior transfusions, negative GI workup done ~ 2yrs ago.  H/H= 10.7/36.3 today), PVD s/p fem-pop bypass B/L, Hyperkalemia with ACE-I/ARB therapy, Chronic Diastolic HF (EF of 53% by Stress 06/2018), Known CAD s/p LEE pLAD @ [facility] on 07/27/18 [facility]2018 now s/p PCI of mid LCx with LEE via left radial approach with no evidence of vascular complications post procedure.    -continue with current medications including dual antiplatelet therapy    -discharge instructions as per protocol    -outpatient follow up with primary cardiologist

## 2018-10-03 NOTE — DISCHARGE NOTE ADULT - PATIENT PORTAL LINK FT
You can access the [facility], offered by [facility], by registering with the following website: [email or web address]

## 2018-10-03 NOTE — DISCHARGE NOTE ADULT - HOSPITAL COURSE
59 y/o former smoker Male POOR HISTORIAN with PMHx of Obesity, HTN, Hyperlipidemia, IDDM, CKD Stage 3 (Cr 1.38/GFR 56 today), Anemia (recently discharged from [facility] 09/2018 w/ H/H of 8.3/27.0 denies prior transfusions, negative GI workup done ~ 2yrs ago.  H/H= 10.7/36.3 today), PVD s/p fem-pop bypass B/L, Hyperkalemia with ACE-I/ARB therapy, Chronic Diastolic HF (EF of 53% by Stress 06/2018), Known CAD s/p LEE pLAD @ [facility] on 07/27/18 [facility]2018 who returns today for stage PCI of residual 90% OM1 lesion.  Since his prior procedure, pt reports compliance with his DAPT; ASA /Brilinta, however he reports DALEY  with increasing fatigue while walking 1/2 block and unable to walk a flight of stairs at a brisk pace with intermittent episodes of dizziness and palpitations.  Pt also has noticed B/L LE swelling and 2 pillow orthopnea.   Denies CP and syncope.  He initially presented  with similar symptoms and was subsequently referred for NST 6/21/2018 which demonstrated moderate sized area of anterior apical and inferior wall ischemia, LV wall motion is normal, EF 53%.  He ultimately was referred for elective cardiac cath with the above mentioned findings.  Repeat Echo performed 9/11/18 showed normal LV systolic function, no wall motion abnormalities, mild to moderate TR, mild MR, mild pulmonary pressures.    Pt underwent Cardiac cath 10/2/18 showing 85-90% mid LCx, dLM 40-50%, patent LAD stent, ostial LCx 60% (IVUS 6.1mm2). Pt had successful LEE x 2 mid LCx.    Pt did well overnight however this AM reported episode of vomiting but was the able to proceed with finishing breakfast and no longer nauseous.  Currently pt without complaints of chest pain, sob or palpitations, abdominal pain, cough. Labs reviewed and BUN/Cr Hgb/HCT stable; WBC elevated without shift and no sign of active infection, afebrile.  Pt has ambulated and voided.    Right Radial access site soft without hematoma 2+ pulse, ap refill < 3sec; abd soft non tender, +BS, lungs clear, CV RRR +S1 S2.  Pt cleared for discharge home per Dr Ronquillo.   Pt instructed to follow up with Dr Gary De L aRosa in 1 week for repeat chemistry and cardiology follow up.  He is also instructed to f/u with PMD/Endocrionology (info for Endo clinic also given to pt) as well as Prevention team for Diabetes management given elevated HgbA1C and risk factors.  Pt will continue ASA, Brilinta, Atorvastatin, Metroprolol Imdur, Hydralazine, and furosemide.

## 2018-10-03 NOTE — DISCHARGE NOTE ADULT - MEDICATION SUMMARY - MEDICATIONS TO TAKE
I will START or STAY ON the medications listed below when I get home from the hospital:    aspirin 81 mg oral delayed release tablet  -- 1 tab(s) by mouth once a day  -- Indication: For Coronary artery Disease and Stent    isosorbide mononitrate 60 mg oral tablet, extended release  -- 1 tab(s) by mouth once a day (in the morning)  -- Indication: For Hypertension and coronary disease    gabapentin 300 mg oral capsule  -- 1 cap(s) by mouth 3 times a day  -- Indication: For Neuropathy    glimepiride 4 mg oral tablet  -- 1 tab(s) by mouth once a day  -- Indication: For Diabetes    Basaglar KwikPen 100 units/mL subcutaneous solution  -- 14 unit(s) subcutaneous once a day (at bedtime)  -- Indication: For Diabetes    atorvastatin 40 mg oral tablet  -- 1 tab(s) by mouth once a day  -- Indication: For Hyperlipidemia    ticagrelor 90 mg oral tablet  -- 1 tab(s) by mouth 2 times a day  -- Indication: For Coronary artery Disease and Stent    Metoprolol Succinate ER 25 mg oral tablet, extended release  -- 3 tab(s) by mouth once a day  -- Indication: For Hypertension    Symbicort 160 mcg-4.5 mcg/inh inhalation aerosol  -- 2 puff(s) inhaled 2 times a day  -- Indication: For COPD    albuterol 0.63 mg/3 mL (0.021%) inhalation solution  -- 3 milliliter(s) inhaled 3 times a day  -- Indication: For COPD    furosemide 40 mg oral tablet  -- orally 2 times a day  -- Indication: For Chronic disatolic chf and Hypertension    docusate sodium 100 mg oral tablet  -- orally 3 times a day  -- Indication: For stool softener for constipation    Senna 8.6 mg oral tablet  -- 2 tab(s) by mouth once a day (at bedtime)  -- Indication: For stool softener for constipation    omeprazole 20 mg oral delayed release capsule  -- 1 cap(s) by mouth once a day  -- Indication: For Acid reflux    hydrALAZINE 50 mg oral tablet  -- orally every 8 hours  -- Indication: For Hypertension    Vitamin B-100 oral tablet  -- 1 tab(s) by mouth once a day  -- Indication: For nutritional supplement    vitamin E 400 intl units oral capsule  -- 1 cap(s) by mouth once a day  -- Indication: For nutritional supplement    folic acid 1 mg oral tablet  -- 1 tab(s) by mouth once a day  -- Indication: For nutritional supplement

## 2018-10-03 NOTE — DISCHARGE NOTE ADULT - PLAN OF CARE
Take your ASPIRIN AND BRILINTA You have a diagnosis of coronary artery disease and  received two Drug Eluting stent to your mid Left Circumflex coronary artery.  You MUST continue taking the daily Aspirin and Brilinta (Ticagrelor) to ensure your stent does not close. DO NOT STOP THESE MEDICATIONS FOR ANY REASON UNLESS OTHERWISE INDICATED BY YOUR CARDIOLOGIST BECAUSE THIS WILL PUT YOU AT RISK FOR A HEART ATTACK. You should refrain from strenuous activity and heavy lifting for 1 week. Please make a follow up appointment with your cardiologist Dr Gary De La Rosa within 1 week of your discharge and also have your creatine level checked.  You have all your prescriptions with adequate refills at home.       The catheter from your wrist was removed and bleeding was stopped by manual pressure.  Wash the site daily with soap and water.  There is no need to put on a bandage.      Call the Interventional Cardiology and Vascular Team at [phone] or [phone] if any of following occur pertaining to your vascular access site:  Bleeding or hematoma formation (collection of blood under the skin), drainage or redness at the puncture site, numbness, decrease in strength, coolness or pale coloration of skin of the leg or hand. You have diabetes and your HgbA1C is elevated at 9.6 as well as your fingerstick blood glucose levels.  Resume your Insulin at nighttime and your glimepiride.  It is also improtant for you to avoid any high carbohydrate or sugary foods.  Increasing your level of activity will help with weight loss.    *You will need to follow up with your PMD/Endocrinologist for ongoing Diabetes management.  Given some of your other risk factors, It is also recommended you see someone for prevention; At [facility] our prevention Doctor Is Doctor Lucía Allen.  You may contact her office for appointement.    -If you do not have an endocrinologist, you can contact our Endocrinology clinic for appointment to establish care.  They are located at [address] [place] [address] .  [phone] Continue taking your home blood pressure medicines Continue taking Atorvastatin. You have peripheral artery disease. continue walking as tolerated, taking your Aspirin and plavix, and follow up with your Cardiologist or vascular surgeon. You have a history of heart failure and should continue your daily lasix. You should weigh yourself daily and if you notice a weight gain of more than 2-3 pounds in 2 days, shortness of breath, or lower extremity swelling you should contact your doctor immediately. Please restrict your fluid intake to 1-2L daily.

## 2018-10-08 DIAGNOSIS — N18.3 CHRONIC KIDNEY DISEASE, STAGE 3 (MODERATE): ICD-10-CM

## 2018-10-08 DIAGNOSIS — I50.32 CHRONIC DIASTOLIC (CONGESTIVE) HEART FAILURE: ICD-10-CM

## 2018-10-08 DIAGNOSIS — E78.5 HYPERLIPIDEMIA, UNSPECIFIED: ICD-10-CM

## 2018-10-08 DIAGNOSIS — I25.118 ATHEROSCLEROTIC HEART DISEASE OF NATIVE CORONARY ARTERY WITH OTHER FORMS OF ANGINA PECTORIS: ICD-10-CM

## 2018-10-08 DIAGNOSIS — I13.0 HYPERTENSIVE HEART AND CHRONIC KIDNEY DISEASE WITH HEART FAILURE AND STAGE 1 THROUGH STAGE 4 CHRONIC KIDNEY DISEASE, OR UNSPECIFIED CHRONIC KIDNEY DISEASE: ICD-10-CM

## 2018-10-08 DIAGNOSIS — E11.51 TYPE 2 DIABETES MELLITUS WITH DIABETIC PERIPHERAL ANGIOPATHY WITHOUT GANGRENE: ICD-10-CM

## 2018-10-08 DIAGNOSIS — E66.9 OBESITY, UNSPECIFIED: ICD-10-CM

## 2018-10-08 DIAGNOSIS — I34.0 NONRHEUMATIC MITRAL (VALVE) INSUFFICIENCY: ICD-10-CM

## 2018-12-26 PROCEDURE — 85610 PROTHROMBIN TIME: CPT

## 2018-12-26 PROCEDURE — C1725: CPT

## 2018-12-26 PROCEDURE — 85025 COMPLETE CBC W/AUTO DIFF WBC: CPT

## 2018-12-26 PROCEDURE — 80048 BASIC METABOLIC PNL TOTAL CA: CPT

## 2018-12-26 PROCEDURE — C1894: CPT

## 2018-12-26 PROCEDURE — 83735 ASSAY OF MAGNESIUM: CPT

## 2018-12-26 PROCEDURE — 86140 C-REACTIVE PROTEIN: CPT

## 2018-12-26 PROCEDURE — 94640 AIRWAY INHALATION TREATMENT: CPT

## 2018-12-26 PROCEDURE — 82962 GLUCOSE BLOOD TEST: CPT

## 2018-12-26 PROCEDURE — C1769: CPT

## 2018-12-26 PROCEDURE — 80053 COMPREHEN METABOLIC PANEL: CPT

## 2018-12-26 PROCEDURE — 36415 COLL VENOUS BLD VENIPUNCTURE: CPT

## 2018-12-26 PROCEDURE — 85027 COMPLETE CBC AUTOMATED: CPT

## 2018-12-26 PROCEDURE — 82550 ASSAY OF CK (CPK): CPT

## 2018-12-26 PROCEDURE — C1874: CPT

## 2018-12-26 PROCEDURE — C1889: CPT

## 2018-12-26 PROCEDURE — 82553 CREATINE MB FRACTION: CPT

## 2018-12-26 PROCEDURE — 83036 HEMOGLOBIN GLYCOSYLATED A1C: CPT

## 2018-12-26 PROCEDURE — 85730 THROMBOPLASTIN TIME PARTIAL: CPT

## 2018-12-26 PROCEDURE — 80061 LIPID PANEL: CPT

## 2018-12-26 PROCEDURE — C1887: CPT

## 2018-12-26 PROCEDURE — C1753: CPT

## 2018-12-26 PROCEDURE — 93005 ELECTROCARDIOGRAM TRACING: CPT

## 2022-03-01 NOTE — PATIENT PROFILE ADULT. - NS TRANSFER PATIENT BELONGINGS
[Difficulty with Vision] : difficulty with vision [Seizure] : seizures [Normal] : Hematologic/Lymphatic Clothing

## 2025-01-15 NOTE — DISCHARGE NOTE ADULT - NS AS DC PROVIDER CONTACT Y/N MULTI
Medical Necessity Clause: This procedure was medically necessary because the lesions that were treated were: Consent: The patient's consent was obtained including but not limited to risks of crusting, scabbing, blistering, scarring, darker or lighter pigmentary change, recurrence, incomplete removal and infection. Medical Necessity Information: It is in your best interest to select a reason for this procedure from the list below. All of these items fulfill various CMS LCD requirements except the new and changing color options. Add 52 Modifier (Optional): no Anesthesia Volume In Cc: 0 Detail Level: Detailed Post-Care Instructions: I reviewed with the patient in detail post-care instructions. Patient is to wear sunprotection, and avoid picking at any of the treated lesions. Pt may apply Vaseline to crusted or scabbing areas. Yes

## 2025-03-14 NOTE — CONSULT NOTE ADULT - CONSULT REQUESTED DATE/TIME
Patient admitted to the hospital. Added to inpatient list. Will follow up after discharge.    10-Sep-2018 19:38